# Patient Record
Sex: MALE | Race: WHITE | NOT HISPANIC OR LATINO | Employment: UNEMPLOYED | URBAN - METROPOLITAN AREA
[De-identification: names, ages, dates, MRNs, and addresses within clinical notes are randomized per-mention and may not be internally consistent; named-entity substitution may affect disease eponyms.]

---

## 2017-01-05 ENCOUNTER — TRANSFERRED RECORDS (OUTPATIENT)
Dept: HEALTH INFORMATION MANAGEMENT | Facility: CLINIC | Age: 32
End: 2017-01-05

## 2017-04-21 DIAGNOSIS — F33.1 MAJOR DEPRESSIVE DISORDER, RECURRENT EPISODE, MODERATE (H): ICD-10-CM

## 2017-04-21 DIAGNOSIS — F41.1 GENERALIZED ANXIETY DISORDER: ICD-10-CM

## 2017-04-21 RX ORDER — CITALOPRAM HYDROBROMIDE 40 MG/1
TABLET ORAL
Qty: 90 TABLET | Refills: 0 | Status: SHIPPED | OUTPATIENT
Start: 2017-04-21 | End: 2017-07-18

## 2017-04-21 NOTE — TELEPHONE ENCOUNTER
Citalopram       Last Written Prescription Date: 12/9/16  Last Fill Quantity: 90; # refills: 0  Last Office Visit with [facility], [facility] or  Health prescribing provider:  11/10/16  PHQ 9 > 4        Last PHQ-9 score on record=   PHQ-9 SCORE 7/28/2016   Total Score -   Total Score 5       Lab Results   Component Value Date    AST 35 11/06/2014     Lab Results   Component Value Date    ALT 81 11/06/2014

## 2017-05-27 DIAGNOSIS — F33.1 MAJOR DEPRESSIVE DISORDER, RECURRENT EPISODE, MODERATE (H): ICD-10-CM

## 2017-05-27 DIAGNOSIS — F41.1 GENERALIZED ANXIETY DISORDER: ICD-10-CM

## 2017-05-31 RX ORDER — CITALOPRAM HYDROBROMIDE 40 MG/1
TABLET ORAL
Qty: 90 TABLET | Refills: 0 | OUTPATIENT
Start: 2017-05-31

## 2017-05-31 NOTE — TELEPHONE ENCOUNTER
Three month supply of this medication sent to requesting pharmacy on 4/21/17.    Too early for refill.    Team coordinators-Please call patient to inform him Celexa was refilled for 90 day supply on 4/21/17 and sent to [facility] in [employer] in [place].    Thank you!  JULIET Heck, VIDYAN, RN

## 2017-07-18 ENCOUNTER — OFFICE VISIT (OUTPATIENT)
Dept: FAMILY MEDICINE | Facility: CLINIC | Age: 32
End: 2017-07-18
Payer: MEDICAID

## 2017-07-18 VITALS
HEIGHT: 72 IN | HEART RATE: 76 BPM | OXYGEN SATURATION: 98 % | SYSTOLIC BLOOD PRESSURE: 124 MMHG | BODY MASS INDEX: 42.66 KG/M2 | DIASTOLIC BLOOD PRESSURE: 82 MMHG | WEIGHT: 315 LBS | TEMPERATURE: 97.9 F

## 2017-07-18 DIAGNOSIS — L50.9 HIVES: Primary | ICD-10-CM

## 2017-07-18 DIAGNOSIS — F33.1 MAJOR DEPRESSIVE DISORDER, RECURRENT EPISODE, MODERATE (H): ICD-10-CM

## 2017-07-18 DIAGNOSIS — H61.23 BILATERAL IMPACTED CERUMEN: ICD-10-CM

## 2017-07-18 DIAGNOSIS — F41.1 GENERALIZED ANXIETY DISORDER: ICD-10-CM

## 2017-07-18 PROCEDURE — 99214 OFFICE O/P EST MOD 30 MIN: CPT | Performed by: FAMILY MEDICINE

## 2017-07-18 RX ORDER — PREDNISONE 50 MG/1
50 TABLET ORAL DAILY
Qty: 5 TABLET | Refills: 0 | Status: SHIPPED | OUTPATIENT
Start: 2017-07-18 | End: 2017-07-23

## 2017-07-18 RX ORDER — CITALOPRAM HYDROBROMIDE 40 MG/1
TABLET ORAL
Qty: 90 TABLET | Refills: 1 | Status: SHIPPED | OUTPATIENT
Start: 2017-07-18 | End: 2018-04-08

## 2017-07-18 ASSESSMENT — ANXIETY QUESTIONNAIRES
6. BECOMING EASILY ANNOYED OR IRRITABLE: SEVERAL DAYS
2. NOT BEING ABLE TO STOP OR CONTROL WORRYING: SEVERAL DAYS
GAD7 TOTAL SCORE: 4
7. FEELING AFRAID AS IF SOMETHING AWFUL MIGHT HAPPEN: NOT AT ALL
1. FEELING NERVOUS, ANXIOUS, OR ON EDGE: SEVERAL DAYS
3. WORRYING TOO MUCH ABOUT DIFFERENT THINGS: SEVERAL DAYS
IF YOU CHECKED OFF ANY PROBLEMS ON THIS QUESTIONNAIRE, HOW DIFFICULT HAVE THESE PROBLEMS MADE IT FOR YOU TO DO YOUR WORK, TAKE CARE OF THINGS AT HOME, OR GET ALONG WITH OTHER PEOPLE: SOMEWHAT DIFFICULT
5. BEING SO RESTLESS THAT IT IS HARD TO SIT STILL: NOT AT ALL

## 2017-07-18 ASSESSMENT — PATIENT HEALTH QUESTIONNAIRE - PHQ9: 5. POOR APPETITE OR OVEREATING: NOT AT ALL

## 2017-07-18 NOTE — LETTER
My Depression Action Plan  Name: Jose Cabello   Date of Birth 1985  Date: 7/18/2017    My doctor: Ludin Chicas   My clinic: [address] [address] [phone]          GREEN    ZONE   Good Control    What it looks like:     Things are going generally well. You have normal up s and down s. You may even feel depressed from time to time, but bad moods usually last less than a day.   What you need to do:  1. Continue to care for yourself (see self care plan)  2. Check your depression survival kit and update it as needed  3. Follow your physician s recommendations including any medication.  4. Do not stop taking medication unless you consult with your physician first.           YELLOW         ZONE Getting Worse    What it looks like:     Depression is starting to interfere with your life.     It may be hard to get out of bed; you may be starting to isolate yourself from others.    Symptoms of depression are starting to last most all day and this has happened for several days.     You may have suicidal thoughts but they are not constant.   What you need to do:     1. Call your care team, your response to treatment will improve if you keep your care team informed of your progress. Yellow periods are signs an adjustment may need to be made.     2. Continue your self-care, even if you have to fake it!    3. Talk to someone in your support network    4. Open up your depression survival kit           RED    ZONE Medical Alert - Get Help    What it looks like:     Depression is seriously interfering with your life.     You may experience these or other symptoms: You can t get out of bed most days, can t work or engage in other necessary activities, you have trouble taking care of basic hygiene, or basic responsibilities, thoughts of suicide or death that will not go away, self-injurious behavior.     What you need to do:  1. Call your care  team and request a same-day appointment. If they are not available (weekends or after hours) call your local crisis line, emergency room or 911.      Electronically signed by: Elsie Graf, July 18, 2017    Depression Self Care Plan / Survival Kit    Self-Care for Depression  Here s the deal. Your body and mind are really not as separate as most people think.  What you do and think affects how you feel and how you feel influences what you do and think. This means if you do things that people who feel good do, it will help you feel better.  Sometimes this is all it takes.  There is also a place for medication and therapy depending on how severe your depression is, so be sure to consult with your medical provider and/ or Behavioral Health Consultant if your symptoms are worsening or not improving.     In order to better manage my stress, I will:    Exercise  Get some form of exercise, every day. This will help reduce pain and release endorphins, the  feel good  chemicals in your brain. This is almost as good as taking antidepressants!  This is not the same as joining a gym and then never going! (they count on that by the way ) It can be as simple as just going for a walk or doing some gardening, anything that will get you moving.      Hygiene   Maintain good hygiene (Get out of bed in the morning, Make your bed, Brush your teeth, Take a shower, and Get dressed like you were going to work, even if you are unemployed).  If your clothes don't fit try to get ones that do.    Diet  I will strive to eat foods that are good for me, drink plenty of water, and avoid excessive sugar, caffeine, alcohol, and other mood-altering substances.  Some foods that are helpful in depression are: complex carbohydrates, B vitamins, flaxseed, fish or fish oil, fresh fruits and vegetables.    Psychotherapy  I agree to participate in Individual Therapy (if recommended).    Medication  If prescribed medications, I agree to take them.   Missing doses can result in serious side effects.  I understand that drinking alcohol, or other illicit drug use, may cause potential side effects.  I will not stop my medication abruptly without first discussing it with my provider.    Staying Connected With Others  I will stay in touch with my friends, family members, and my primary care provider/team.    Use your imagination  Be creative.  We all have a creative side; it doesn t matter if it s oil painting, sand castles, or mud pies! This will also kick up the endorphins.    Witness Beauty  (AKA stop and smell the roses) Take a look outside, even in mid-winter. Notice colors, textures. Watch the squirrels and birds.     Service to others  Be of service to others.  There is always someone else in need.  By helping others we can  get out of ourselves  and remember the really important things.  This also provides opportunities for practicing all the other parts of the program.    Humor  Laugh and be silly!  Adjust your TV habits for less news and crime-drama and more comedy.    Control your stress  Try breathing deep, massage therapy, biofeedback, and meditation. Find time to relax each day.     My support system    Clinic Contact:  Phone number:    Contact 1:  Phone number:    Contact 2:  Phone number:    Nondenominational/:  Phone number:    Therapist:  Phone number:    Local crisis center:    Phone number:    Other community support:  Phone number:

## 2017-07-18 NOTE — NURSING NOTE
Chief Complaint   Patient presents with     Derm Problem       Initial /82 (Cuff Size: Adult Large)  Pulse 76  Temp 97.9  F (36.6  C) (Oral)  Ht 6' (1.829 m)  Wt (!) 338 lb 4 oz (153.4 kg)  SpO2 98%  BMI 45.87 kg/m2 Estimated body mass index is 45.87 kg/(m^2) as calculated from the following:    Height as of this encounter: 6' (1.829 m).    Weight as of this encounter: 338 lb 4 oz (153.4 kg).  Medication Reconciliation: complete     Elsie Graf, CMA

## 2017-07-18 NOTE — PROGRESS NOTES
SUBJECTIVE:                                                    Jose Cabello is a 31 year old male who presents to clinic today for the following health issues:      Rash      Duration: couple weeks     Description  Location: both arms and thighs   Itching: severe without medication    Intensity:  mild    Accompanying signs and symptoms: redness, welts     History (similar episodes/previous evaluation): hx of same thing 7 years     Precipitating or alleviating factors:  New exposures:  clothes detergant extra and usually tide or purex and was around cat hair   Recent travel: no      Therapies tried and outcome: wal-dryl outcome: helps itching     Additional: pt would like ears checked.     Laundry detergent changed.   Itchy. 2 cats.   No pus collection. No fever.   No sick contacts. No scabies contact.    Left ear wax and hard to hear. No qtip use.     celexa is working well. No side effects.     Problem list and histories reviewed & adjusted, as indicated.  Additional history: as documented    Labs reviewed in EPIC    Reviewed and updated as needed this visit by clinical staff       Reviewed and updated as needed this visit by Provider           Social History     Social History     Marital status: Single     Spouse name: N/A     Number of children: N/A     Years of education: N/A     Social History Main Topics     Smoking status: Former Smoker     Types: Cigarettes     Quit date: 12/26/2010     Smokeless tobacco: Never Used     Alcohol use No     Drug use: No     Sexual activity: Yes     Partners: Female     Other Topics Concern     Parent/Sibling W/ Cabg, Mi Or Angioplasty Before 65f 55m? No     Social History Narrative    Balanced Diet - Yes    Osteoporosis Preventative measures-  Dairy servings per day: 2-3    Regular Exercise -  No     Dental Exam up - NO    Eye Exam - NO    Self Testicular Exam -  Yes    Do you have any concerns about STD's -  No    Abuse: Current or Past (Physical, Sexual or Emotional)-  No    Do you feel safe in your environment - Yes    Guns stored in the home - No    Sunscreen used - Yes    Seatbelts used - Yes    Lipids - UNKNOWN    Glucose -  UNKNOWN    Colon Cancer Screening -N/A    Hemoccults - NO    PSA - N/A    Digital Rectal Exam - N/A    Immunizations reviewed and up to date -unknown             Allergies   Allergen Reactions     Effexor [Venlafaxine]      Patient Active Problem List   Diagnosis     CARDIOVASCULAR SCREENING; LDL GOAL LESS THAN 160     Moderate major depression (H)     Generalized anxiety disorder     Obese     Reviewed medications, social history and  past medical and surgical history.    Review of system: for general, respiratory, CVS, GI and psychiatry negative except for noted above.     EXAM:  /82 (Cuff Size: Adult Large)  Pulse 76  Temp 97.9  F (36.6  C) (Oral)  Ht 6' (1.829 m)  Wt (!) 338 lb 4 oz (153.4 kg)  SpO2 98%  BMI 45.87 kg/m2  Constitutional: healthy, alert and no distress   Psychiatric: mentation appears normal and affect normal/bright  ENT - both TM partially obscure with ear wax.   SKIN: both extremities and torso with many erythematous wheal formation lesions in various size, no fingers, hands, axilla, groin involvement.     ASSESSMENT / PLAN:  (L50.9) Hives  (primary encounter diagnosis)  Comment: most likely from his detergent use. Avoid irritant. Prednisone and zantac as prescribed. Side effects discussed.   Plan: predniSONE (DELTASONE) 50 MG tablet, ranitidine        (ZANTAC) 150 MG tablet             (F41.1) Generalized anxiety disorder  Comment:  Patient is tolerating current medication without any major side effects of concerns and current dose seems reasonable too.  Current medication regime is effective. Continue current treatment without any changes.   Plan: citalopram (CELEXA) 40 MG tablet             (F33.1) Major depressive disorder, recurrent episode, moderate (H)  Comment:  Patient is tolerating current medication without any  major side effects of concerns and current dose seems reasonable too.  Current medication regime is effective. Continue current treatment without any changes.   Plan: citalopram (CELEXA) 40 MG tablet             (H61.23) Bilateral impacted cerumen  Comment: ear wash byMA  Plan: HC REMOVAL IMPACTED CERUMEN IRRIGATION/LVG         UNILAT

## 2017-07-19 ASSESSMENT — PATIENT HEALTH QUESTIONNAIRE - PHQ9: SUM OF ALL RESPONSES TO PHQ QUESTIONS 1-9: 5

## 2017-07-19 ASSESSMENT — ANXIETY QUESTIONNAIRES: GAD7 TOTAL SCORE: 4

## 2018-04-08 DIAGNOSIS — F33.1 MAJOR DEPRESSIVE DISORDER, RECURRENT EPISODE, MODERATE (H): ICD-10-CM

## 2018-04-08 DIAGNOSIS — F41.1 GENERALIZED ANXIETY DISORDER: ICD-10-CM

## 2018-04-08 NOTE — LETTER
[facility]   [address]   [address]  [phone]      April 24, 2018      Jose Cabello  [address] [place] [address]          Hi Jose Cabello    Your provider Ludin Chicas, at [facility] would like to know how you are doing with your depression. Please take 1-2 minutes to complete the attached PHQ-9 questionnaire even if you have recently completed one at the clinic and send it back to us via mail or [facility]. We will  review your responses and look forward to seeing you at your next visit.     If you don t have an appointment you can call the clinic at [phone] and schedule an appointment with your Primary Care Physician OR I have a great colleague named Corwin Cross who works at the [facility], who is a Behavioral Health Clinician who could discuss some ways to improve your symptoms, quality of life, or stress. Corwin works closely with your provider and will consult with him/her about any plans you two come agree on to improve you overall health.     Sincerely,       Your [facility] Primary [facility] Team

## 2018-04-09 NOTE — TELEPHONE ENCOUNTER
"Requested Prescriptions   Pending Prescriptions Disp Refills     citalopram (CELEXA) 40 MG tablet [Pharmacy Med Name: CITALOPRAM 40MG TABLETS]  Last Written Prescription Date:  7/18/2017  Last Fill Quantity: 90 tablet,  # refills: 1   Last Office Visit: 7/18/2017   Future Office Visit:      90 tablet 0     Sig: TAKE ONE TABLET BY MOUTH DAILY    SSRIs Protocol Failed    4/8/2018  4:01 AM       Failed - PHQ-9 score less than 5 in past 6 months    Please review last PHQ-9 score.   PHQ-9 SCORE 9/28/2015 7/28/2016 7/18/2017   Total Score - - -   Total Score 5 5 5     MANUEL-7 SCORE 9/28/2015 7/18/2017   Total Score 4 4          Failed - Recent (6 mo) or future (30 days) visit within the authorizing provider's specialty    Patient had office visit in the last 6 months or has a visit in the next 30 days with authorizing provider or within the authorizing provider's specialty.  See \"Patient Info\" tab in inbasket, or \"Choose Columns\" in Meds & Orders section of the refill encounter.           Passed - Patient is age 18 or older          "

## 2018-04-11 RX ORDER — CITALOPRAM HYDROBROMIDE 40 MG/1
TABLET ORAL
Qty: 90 TABLET | Refills: 0 | Status: SHIPPED | OUTPATIENT
Start: 2018-04-11 | End: 2018-05-15

## 2018-04-11 NOTE — TELEPHONE ENCOUNTER
Routing refill request to provider for review/approval because:  PHQ-9 > 4 and overdue for updated PHQ-9.    Dr. Chicas-Please sign if agree.    MA-Please contact patient to complete PHQ-9.    Thank you!  VIDYA ArevaloN, RN

## 2018-04-24 NOTE — TELEPHONE ENCOUNTER
I have attempted to contact this patient by phone with the following results:  WRONG #      LETTER WAS SENT MAILED OUT.    Michelet Villavicencio MA

## 2018-05-01 ASSESSMENT — ANXIETY QUESTIONNAIRES
GAD7 TOTAL SCORE: 4
2. NOT BEING ABLE TO STOP OR CONTROL WORRYING: NOT AT ALL
IF YOU CHECKED OFF ANY PROBLEMS ON THIS QUESTIONNAIRE, HOW DIFFICULT HAVE THESE PROBLEMS MADE IT FOR YOU TO DO YOUR WORK, TAKE CARE OF THINGS AT HOME, OR GET ALONG WITH OTHER PEOPLE: SOMEWHAT DIFFICULT
7. FEELING AFRAID AS IF SOMETHING AWFUL MIGHT HAPPEN: NOT AT ALL
5. BEING SO RESTLESS THAT IT IS HARD TO SIT STILL: NOT AT ALL
6. BECOMING EASILY ANNOYED OR IRRITABLE: MORE THAN HALF THE DAYS
3. WORRYING TOO MUCH ABOUT DIFFERENT THINGS: SEVERAL DAYS
1. FEELING NERVOUS, ANXIOUS, OR ON EDGE: SEVERAL DAYS

## 2018-05-01 ASSESSMENT — PATIENT HEALTH QUESTIONNAIRE - PHQ9: 5. POOR APPETITE OR OVEREATING: NOT AT ALL

## 2018-05-01 NOTE — TELEPHONE ENCOUNTER
phq-9 has been mailed back and put into assessements.     PHQ-9 score:    PHQ-9 SCORE 5/1/2018   Total Score -   Total Score 4             Elsie Graf CMA

## 2018-05-02 ASSESSMENT — PATIENT HEALTH QUESTIONNAIRE - PHQ9: SUM OF ALL RESPONSES TO PHQ QUESTIONS 1-9: 4

## 2018-05-02 ASSESSMENT — ANXIETY QUESTIONNAIRES: GAD7 TOTAL SCORE: 4

## 2018-05-15 ENCOUNTER — OFFICE VISIT (OUTPATIENT)
Dept: FAMILY MEDICINE | Facility: CLINIC | Age: 33
End: 2018-05-15
Payer: COMMERCIAL

## 2018-05-15 VITALS
TEMPERATURE: 97.9 F | HEART RATE: 70 BPM | SYSTOLIC BLOOD PRESSURE: 123 MMHG | WEIGHT: 315 LBS | RESPIRATION RATE: 16 BRPM | OXYGEN SATURATION: 96 % | HEIGHT: 72 IN | BODY MASS INDEX: 42.66 KG/M2 | DIASTOLIC BLOOD PRESSURE: 86 MMHG

## 2018-05-15 DIAGNOSIS — F41.1 GENERALIZED ANXIETY DISORDER: ICD-10-CM

## 2018-05-15 DIAGNOSIS — F33.1 MAJOR DEPRESSIVE DISORDER, RECURRENT EPISODE, MODERATE (H): ICD-10-CM

## 2018-05-15 PROCEDURE — 99214 OFFICE O/P EST MOD 30 MIN: CPT | Performed by: FAMILY MEDICINE

## 2018-05-15 RX ORDER — CITALOPRAM HYDROBROMIDE 40 MG/1
40 TABLET ORAL DAILY
Qty: 90 TABLET | Refills: 2 | Status: SHIPPED | OUTPATIENT
Start: 2018-07-01 | End: 2019-10-23

## 2018-05-15 ASSESSMENT — ANXIETY QUESTIONNAIRES
6. BECOMING EASILY ANNOYED OR IRRITABLE: SEVERAL DAYS
GAD7 TOTAL SCORE: 3
1. FEELING NERVOUS, ANXIOUS, OR ON EDGE: SEVERAL DAYS
4. TROUBLE RELAXING: NOT AT ALL
2. NOT BEING ABLE TO STOP OR CONTROL WORRYING: NOT AT ALL
3. WORRYING TOO MUCH ABOUT DIFFERENT THINGS: SEVERAL DAYS
7. FEELING AFRAID AS IF SOMETHING AWFUL MIGHT HAPPEN: NOT AT ALL
6. BECOMING EASILY ANNOYED OR IRRITABLE: SEVERAL DAYS
7. FEELING AFRAID AS IF SOMETHING AWFUL MIGHT HAPPEN: NOT AT ALL
3. WORRYING TOO MUCH ABOUT DIFFERENT THINGS: SEVERAL DAYS
7. FEELING AFRAID AS IF SOMETHING AWFUL MIGHT HAPPEN: NOT AT ALL
5. BEING SO RESTLESS THAT IT IS HARD TO SIT STILL: NOT AT ALL
GAD7 TOTAL SCORE: 3
5. BEING SO RESTLESS THAT IT IS HARD TO SIT STILL: NOT AT ALL
2. NOT BEING ABLE TO STOP OR CONTROL WORRYING: NOT AT ALL
1. FEELING NERVOUS, ANXIOUS, OR ON EDGE: SEVERAL DAYS

## 2018-05-15 ASSESSMENT — PATIENT HEALTH QUESTIONNAIRE - PHQ9
5. POOR APPETITE OR OVEREATING: NOT AT ALL
10. IF YOU CHECKED OFF ANY PROBLEMS, HOW DIFFICULT HAVE THESE PROBLEMS MADE IT FOR YOU TO DO YOUR WORK, TAKE CARE OF THINGS AT HOME, OR GET ALONG WITH OTHER PEOPLE: SOMEWHAT DIFFICULT
SUM OF ALL RESPONSES TO PHQ QUESTIONS 1-9: 6
SUM OF ALL RESPONSES TO PHQ QUESTIONS 1-9: 6

## 2018-05-15 NOTE — PROGRESS NOTES
SUBJECTIVE:   Jose Cabello is a 32 year old male who presents to clinic today for the following health issues:      Depression and Anxiety Follow-Up    Status since last visit: has been improving and then worsening     Other associated symptoms:None    Complicating factors:     Significant life event: No     Current substance abuse: None    PHQ-9 5/1/2018 5/15/2018 5/15/2018   Total Score 4 6 6   Q9: Suicide Ideation Not at all Not at all Not at all     MANUEL-7 SCORE 5/1/2018 5/15/2018 5/15/2018   Total Score - - 3 (minimal anxiety)   Total Score 4 3 3        PHQ-9  English  PHQ-9   Any Language  MANUEL-7  Suicide Assessment Five-step Evaluation and Treatment (SAFE-T)    Amount of exercise or physical activity: 2-3 days/week for an average of less than 15 minutes    Problems taking medications regularly: No    Medication side effects: none    Diet: regular (no restrictions)    Seeing therapist - [facility] - seeing every 2 weeks - helping.     No suicidal thoughts.     Both ears are plugged. Needs wash.     Problem list and histories reviewed & adjusted, as indicated.  Additional history: as documented    Labs reviewed in EPIC    Reviewed and updated as needed this visit by clinical staff       Reviewed and updated as needed this visit by Provider      Answers for HPI/ROS submitted by the patient on 5/15/2018   Chronic problems general questions HPI Form  If you checked off any problems, how difficult have these problems made it for you to do your work, take care of things at home, or get along with other people?: Somewhat difficult  PHQ9 TOTAL SCORE: 6  MANUEL 7 TOTAL SCORE: 3  Frequency of exercise:: 2-3 days/week  Taking medications regularly:: Yes  Additional concerns today:: No  PHQ-2 Score: 2  Duration of exercise:: Less than 15 minutes      Social History     Social History     Marital status: Single     Spouse name: N/A     Number of children: N/A     Years of education: N/A     Occupational History      Not on file.     Social History Main Topics     Smoking status: Former Smoker     Types: Cigarettes     Quit date: 12/26/2010     Smokeless tobacco: Never Used     Alcohol use No     Drug use: No     Sexual activity: Yes     Partners: Female     Other Topics Concern     Parent/Sibling W/ Cabg, Mi Or Angioplasty Before 65f 55m? No     Social History Narrative    Balanced Diet - Yes    Osteoporosis Preventative measures-  Dairy servings per day: 2-3    Regular Exercise -  No     Dental Exam up - NO    Eye Exam - NO    Self Testicular Exam -  Yes    Do you have any concerns about STD's -  No    Abuse: Current or Past (Physical, Sexual or Emotional)- No    Do you feel safe in your environment - Yes    Guns stored in the home - No    Sunscreen used - Yes    Seatbelts used - Yes    Lipids - UNKNOWN    Glucose -  UNKNOWN    Colon Cancer Screening -N/A    Hemoccults - NO    PSA - N/A    Digital Rectal Exam - N/A    Immunizations reviewed and up to date -unknown             Allergies   Allergen Reactions     Effexor [Venlafaxine]      Patient Active Problem List   Diagnosis     CARDIOVASCULAR SCREENING; LDL GOAL LESS THAN 160     Moderate major depression (H)     Generalized anxiety disorder     Obese     Reviewed medications, social history and  past medical and surgical history.    Review of system: for general, respiratory, CVS, GI and psychiatry negative except for noted above.     EXAM:  /86 (Cuff Size: Adult Large)  Pulse 70  Temp 97.9  F (36.6  C) (Oral)  Resp 16  Ht 6' (1.829 m)  Wt (!) 340 lb 12 oz (154.6 kg)  SpO2 96%  BMI 46.21 kg/m2  Constitutional: healthy, alert and no distress   Psychiatric: mentation appears normal and affect normal/bright  Ear - mild wax.     ASSESSMENT / PLAN:  (F41.1) Generalized anxiety disorder  Comment: Patient is tolerating current medication without any major side effects of concerns and current dose seems reasonable too.  Current medication regime is effective.  Continue current treatment without any changes.    Plan: citalopram (CELEXA) 40 MG tablet       (F33.1) Major depressive disorder, recurrent episode, moderate (H)  Comment:  Patient is tolerating current medication without any major side effects of concerns and current dose seems reasonable too.  Current medication regime is effective. Continue current treatment without any changes.    Plan: citalopram (CELEXA) 40 MG tablet          Going to come back in 6 months for follow up and labs at that time.

## 2018-05-15 NOTE — MR AVS SNAPSHOT
"              After Visit Summary   5/15/2018    Jose Cabello    MRN: [id number]           Patient Information     Date Of Birth          1985        Visit Information        Provider Department      5/15/2018 10:40 AM Ludin Chicas MD [facility]        Today's Diagnoses     Generalized anxiety disorder        Major depressive disorder, recurrent episode, moderate (H)           Follow-ups after your visit        Who to contact     If you have questions or need follow up information about today's clinic visit or your schedule please contact [facility] directly at [phone].  Normal or non-critical lab and imaging results will be communicated to you by MyChart, letter or phone within 4 business days after the clinic has received the results. If you do not hear from us within 7 days, please contact the clinic through [facility] or phone. If you have a critical or abnormal lab result, we will notify you by phone as soon as possible.  Submit refill requests through [facility] or call your pharmacy and they will forward the refill request to us. Please allow 3 business days for your refill to be completed.          Additional Information About Your Visit        MyChart Information     [facility] lets you send messages to your doctor, view your test results, renew your prescriptions, schedule appointments and more. To sign up, go to [email or web address] . Click on \"Log in\" on the left side of the screen, which will take you to the Welcome page. Then click on \"Sign up Now\" on the right side of the page.     You will be asked to enter the access code listed below, as well as some personal information. Please follow the directions to create your username and password.     Your access code is: [email or web address]  Expires: 2018 10:57 AM     Your access code will  in 90 days. If you need help or a new code, please call your [facility] or [phone].        Care " EveryWhere ID     This is your Care EveryWhere ID. This could be used by other organizations to access your [facility] medical records  [id number]        Your Vitals Were     Pulse Temperature Respirations Height Pulse Oximetry BMI (Body Mass Index)    70 97.9  F (36.6  C) (Oral) 16 6' (1.829 m) 96% 46.21 kg/m2       Blood Pressure from Last 3 Encounters:   05/15/18 123/86   07/18/17 124/82   12/07/16 118/84    Weight from Last 3 Encounters:   05/15/18 (!) 340 lb 12 oz (154.6 kg)   07/18/17 (!) 338 lb 4 oz (153.4 kg)   12/07/16 (!) 340 lb (154.2 kg)              Today, you had the following     No orders found for display         Today's Medication Changes          These changes are accurate as of 5/15/18 11:46 AM.  If you have any questions, ask your nurse or doctor.               These medicines have changed or have updated prescriptions.        Dose/Directions    citalopram 40 MG tablet   Commonly known as:  celeXA   This may have changed:  See the new instructions.   Used for:  Generalized anxiety disorder, Major depressive disorder, recurrent episode, moderate (H)   Changed by:  Ludin Chicas MD        Dose:  40 mg   Start taking on:  7/1/2018   Take 1 tablet (40 mg) by mouth daily   Quantity:  90 tablet   Refills:  2            Where to get your medicines      These medications were sent to [facility] [address] [place] [address] & [address] [address]    Hours:  24-hours Phone:  [phone]     citalopram 40 MG tablet                Primary Care Provider Office Phone # Fax #    Ludin Chicas [phone] [address] [place] [address]        Equal Access to Services     ADRIEL GTZ AH: Hector Longoria, waroland luqginny, qaybta kaalmachar hermosillo, scotty tsang. [facility] [phone].    ATENCIÓN: Si maryann vega, tiene a garcia disposición  servicios gratuitos de asistencia lingüística. Miriam hazel [phone].    We comply with applicable federal civil rights laws and [place] laws. We do not discriminate on the basis of race, color, national origin, age, disability, sex, sexual orientation, or gender identity.            Thank you!     Thank you for choosing [facility]  for your care. Our goal is always to provide you with excellent care. Hearing back from our patients is one way we can continue to improve our services. Please take a few minutes to complete the written survey that you may receive in the mail after your visit with us. Thank you!             Your Updated Medication List - Protect others around you: Learn how to safely use, store and throw away your medicines at www.disposemymeds.org.          This list is accurate as of 5/15/18 11:46 AM.  Always use your most recent med list.                   Brand Name Dispense Instructions for use Diagnosis    acetaminophen 500 MG Caps     60 capsule    1-2 tablets 3 times per day as needed for pain    Pain in joint, ankle and foot, right       citalopram 40 MG tablet   Start taking on:  7/1/2018    celeXA    90 tablet    Take 1 tablet (40 mg) by mouth daily    Generalized anxiety disorder, Major depressive disorder, recurrent episode, moderate (H)       ranitidine 150 MG tablet    ZANTAC    60 tablet    Take 1 tablet (150 mg) by mouth 2 times daily    Hives

## 2018-05-16 ASSESSMENT — ANXIETY QUESTIONNAIRES: GAD7 TOTAL SCORE: 3

## 2018-05-16 ASSESSMENT — PATIENT HEALTH QUESTIONNAIRE - PHQ9: SUM OF ALL RESPONSES TO PHQ QUESTIONS 1-9: 6

## 2018-11-12 ENCOUNTER — TELEPHONE (OUTPATIENT)
Dept: FAMILY MEDICINE | Facility: CLINIC | Age: 33
End: 2018-11-12

## 2018-11-12 NOTE — TELEPHONE ENCOUNTER
"A couple weeks ago was eating chicken and had the sensation that some got caught in his throat. Felt like it was stuck there for a day or two and then the feeling kind of traveled lower and it has settled on the left side of his chest, feels like a \"poking pain.\"    It still hurts when swallowing, notices it when lying down.    It is not crushing, squeezing pain. The pain does not travel or radiate. He is not nauseous, diaphoretic, light headed or dizzy. He is sometimes SOB but he said \"I'm overweight and always a little short of breath.\"    No red flag symptoms; appointment with PCP made for Wednesday 11/14. Advised to resume trial of ranitidine in the meantime (on eMAR from previous).    Griselda Tucker, VIDYAN, RN  [facility]       "

## 2018-11-14 ENCOUNTER — RADIANT APPOINTMENT (OUTPATIENT)
Dept: GENERAL RADIOLOGY | Facility: CLINIC | Age: 33
End: 2018-11-14
Attending: FAMILY MEDICINE
Payer: COMMERCIAL

## 2018-11-14 ENCOUNTER — OFFICE VISIT (OUTPATIENT)
Dept: FAMILY MEDICINE | Facility: CLINIC | Age: 33
End: 2018-11-14
Payer: COMMERCIAL

## 2018-11-14 VITALS
TEMPERATURE: 98.1 F | RESPIRATION RATE: 16 BRPM | OXYGEN SATURATION: 98 % | SYSTOLIC BLOOD PRESSURE: 130 MMHG | BODY MASS INDEX: 42.66 KG/M2 | HEIGHT: 72 IN | WEIGHT: 315 LBS | HEART RATE: 94 BPM | DIASTOLIC BLOOD PRESSURE: 83 MMHG

## 2018-11-14 DIAGNOSIS — E66.01 MORBID OBESITY (H): ICD-10-CM

## 2018-11-14 DIAGNOSIS — R07.9 ACUTE CHEST PAIN: Primary | ICD-10-CM

## 2018-11-14 DIAGNOSIS — R07.9 ACUTE CHEST PAIN: ICD-10-CM

## 2018-11-14 PROCEDURE — 99214 OFFICE O/P EST MOD 30 MIN: CPT | Performed by: FAMILY MEDICINE

## 2018-11-14 PROCEDURE — 93000 ELECTROCARDIOGRAM COMPLETE: CPT | Performed by: FAMILY MEDICINE

## 2018-11-14 PROCEDURE — 71046 X-RAY EXAM CHEST 2 VIEWS: CPT

## 2018-11-14 ASSESSMENT — ANXIETY QUESTIONNAIRES
GAD7 TOTAL SCORE: 2
5. BEING SO RESTLESS THAT IT IS HARD TO SIT STILL: NOT AT ALL
IF YOU CHECKED OFF ANY PROBLEMS ON THIS QUESTIONNAIRE, HOW DIFFICULT HAVE THESE PROBLEMS MADE IT FOR YOU TO DO YOUR WORK, TAKE CARE OF THINGS AT HOME, OR GET ALONG WITH OTHER PEOPLE: NOT DIFFICULT AT ALL
1. FEELING NERVOUS, ANXIOUS, OR ON EDGE: NOT AT ALL
2. NOT BEING ABLE TO STOP OR CONTROL WORRYING: NOT AT ALL
3. WORRYING TOO MUCH ABOUT DIFFERENT THINGS: SEVERAL DAYS
6. BECOMING EASILY ANNOYED OR IRRITABLE: SEVERAL DAYS
7. FEELING AFRAID AS IF SOMETHING AWFUL MIGHT HAPPEN: NOT AT ALL

## 2018-11-14 ASSESSMENT — PATIENT HEALTH QUESTIONNAIRE - PHQ9
SUM OF ALL RESPONSES TO PHQ QUESTIONS 1-9: 4
5. POOR APPETITE OR OVEREATING: NOT AT ALL

## 2018-11-14 NOTE — MR AVS SNAPSHOT
After Visit Summary   11/14/2018    Jose Cabello    MRN: [id number]           Patient Information     Date Of Birth          1985        Visit Information        Provider Department      11/14/2018 2:00 PM Ludin Chicas MD [facility]        Today's Diagnoses     Acute chest pain    -  1       Follow-ups after your visit        Future tests that were ordered for you today     Open Future Orders        Priority Expected Expires Ordered    XR Chest 2 Views Routine 11/14/2018 11/14/2019 11/14/2018            Who to contact     If you have questions or need follow up information about today's clinic visit or your schedule please contact [facility] directly at [phone].  Normal or non-critical lab and imaging results will be communicated to you by MyChart, letter or phone within 4 business days after the clinic has received the results. If you do not hear from us within 7 days, please contact the clinic through MyChart or phone. If you have a critical or abnormal lab result, we will notify you by phone as soon as possible.  Submit refill requests through [employer] or call your pharmacy and they will forward the refill request to us. Please allow 3 business days for your refill to be completed.          Additional Information About Your Visit        Care EveryWhere ID     This is your Care EveryWhere ID. This could be used by other organizations to access your [place] medical records  [id number]        Your Vitals Were     Pulse Temperature Respirations Height Pulse Oximetry BMI (Body Mass Index)    94 98.1  F (36.7  C) (Oral) 16 6' (1.829 m) 98% 46.69 kg/m2       Blood Pressure from Last 3 Encounters:   11/14/18 130/83   05/15/18 123/86   07/18/17 124/82    Weight from Last 3 Encounters:   11/14/18 (!) 344 lb 4 oz (156.2 kg)   05/15/18 (!) 340 lb 12 oz (154.6 kg)   07/18/17 (!) 338 lb 4 oz (153.4 kg)              We Performed the Following      DEPRESSION ACTION PLAN (DAP)     EKG 12-lead complete w/read - Clinics        Primary Care Provider Office Phone # Fax #    Ludin Juan Chicas [phone] [address] [place] [address]        Equal Access to Services     MERAJESSICA ULISSES : Hadii aad ku hadtrinityo Soomaali, waaxda luqadaha, qaybta kaalmada adeegyada, waxmalia bhumikain angien adetim galloway darrell tsang. So [facility] [phone].    ATENCIÓN: Si habla español, tiene a garcia disposición servicios gratuitos de asistencia lingüística. Llame al [phone].    We comply with applicable federal civil rights laws and [place] laws. We do not discriminate on the basis of race, color, national origin, age, disability, sex, sexual orientation, or gender identity.            Thank you!     Thank you for choosing [facility]  for your care. Our goal is always to provide you with excellent care. Hearing back from our patients is one way we can continue to improve our services. Please take a few minutes to complete the written survey that you may receive in the mail after your visit with us. Thank you!             Your Updated Medication List - Protect others around you: Learn how to safely use, store and throw away your medicines at www.disposemymeds.org.          This list is accurate as of 11/14/18  2:35 PM.  Always use your most recent med list.                   Brand Name Dispense Instructions for use Diagnosis    acetaminophen 500 MG Caps     60 capsule    1-2 tablets 3 times per day as needed for pain    Pain in joint, ankle and foot, right       citalopram 40 MG tablet    celeXA    90 tablet    Take 1 tablet (40 mg) by mouth daily    Generalized anxiety disorder, Major depressive disorder, recurrent episode, moderate (H)

## 2018-11-14 NOTE — LETTER
My Depression Action Plan  Name: Jose Cabello   Date of Birth 1985  Date: 11/14/2018    My doctor: Ludin Chicas   My clinic: [address] [address] [phone]          GREEN    ZONE   Good Control    What it looks like:     Things are going generally well. You have normal up s and down s. You may even feel depressed from time to time, but bad moods usually last less than a day.   What you need to do:  1. Continue to care for yourself (see self care plan)  2. Check your depression survival kit and update it as needed  3. Follow your physician s recommendations including any medication.  4. Do not stop taking medication unless you consult with your physician first.           YELLOW         ZONE Getting Worse    What it looks like:     Depression is starting to interfere with your life.     It may be hard to get out of bed; you may be starting to isolate yourself from others.    Symptoms of depression are starting to last most all day and this has happened for several days.     You may have suicidal thoughts but they are not constant.   What you need to do:     1. Call your care team, your response to treatment will improve if you keep your care team informed of your progress. Yellow periods are signs an adjustment may need to be made.     2. Continue your self-care, even if you have to fake it!    3. Talk to someone in your support network    4. Open up your depression survival kit           RED    ZONE Medical Alert - Get Help    What it looks like:     Depression is seriously interfering with your life.     You may experience these or other symptoms: You can t get out of bed most days, can t work or engage in other necessary activities, you have trouble taking care of basic hygiene, or basic responsibilities, thoughts of suicide or death that will not go away, self-injurious behavior.     What you need to do:  1. Call your  care team and request a same-day appointment. If they are not available (weekends or after hours) call your local crisis line, emergency room or 911.            Depression Self Care Plan / Survival Kit    Self-Care for Depression  Here s the deal. Your body and mind are really not as separate as most people think.  What you do and think affects how you feel and how you feel influences what you do and think. This means if you do things that people who feel good do, it will help you feel better.  Sometimes this is all it takes.  There is also a place for medication and therapy depending on how severe your depression is, so be sure to consult with your medical provider and/ or Behavioral Health Consultant if your symptoms are worsening or not improving.     In order to better manage my stress, I will:    Exercise  Get some form of exercise, every day. This will help reduce pain and release endorphins, the  feel good  chemicals in your brain. This is almost as good as taking antidepressants!  This is not the same as joining a gym and then never going! (they count on that by the way ) It can be as simple as just going for a walk or doing some gardening, anything that will get you moving.      Hygiene   Maintain good hygiene (Get out of bed in the morning, Make your bed, Brush your teeth, Take a shower, and Get dressed like you were going to work, even if you are unemployed).  If your clothes don't fit try to get ones that do.    Diet  I will strive to eat foods that are good for me, drink plenty of water, and avoid excessive sugar, caffeine, alcohol, and other mood-altering substances.  Some foods that are helpful in depression are: complex carbohydrates, B vitamins, flaxseed, fish or fish oil, fresh fruits and vegetables.    Psychotherapy  I agree to participate in Individual Therapy (if recommended).    Medication  If prescribed medications, I agree to take them.  Missing doses can result in serious side effects.  I  understand that drinking alcohol, or other illicit drug use, may cause potential side effects.  I will not stop my medication abruptly without first discussing it with my provider.    Staying Connected With Others  I will stay in touch with my friends, family members, and my primary care provider/team.    Use your imagination  Be creative.  We all have a creative side; it doesn t matter if it s oil painting, sand castles, or mud pies! This will also kick up the endorphins.    Witness Beauty  (AKA stop and smell the roses) Take a look outside, even in mid-winter. Notice colors, textures. Watch the squirrels and birds.     Service to others  Be of service to others.  There is always someone else in need.  By helping others we can  get out of ourselves  and remember the really important things.  This also provides opportunities for practicing all the other parts of the program.    Humor  Laugh and be silly!  Adjust your TV habits for less news and crime-drama and more comedy.    Control your stress  Try breathing deep, massage therapy, biofeedback, and meditation. Find time to relax each day.     My support system    Clinic Contact:  Phone number:    Contact 1:  Phone number:    Contact 2:  Phone number:    Jain/:  Phone number:    Therapist:  Phone number:    Local crisis center:    Phone number:    Other community support:  Phone number:

## 2018-11-14 NOTE — PROGRESS NOTES
SUBJECTIVE:   Jose Cabello is a 32 year old male who presents to clinic today for the following health issues:      Chest Pain      Onset: 2.5 weeks     Description (location/character/radiation/duration): starting by throat radiating to left side breast, sharp pain, constant     Intensity:  mild    Accompanying signs and symptoms:        Shortness of breath: YES- after activities        Sweating: no        Nausea/vomitting: no        Palpitations: no        Other (fevers/chills/cough/heartburn/lightheadedness): YES- left side headache     History (similar episodes/previous evaluation): acid reflux as a child     Precipitating or alleviating factors:       Worse with exertion: no        Worse with breathing: no        Related to eating: YES- swallowing solids and liquids can cause this pain       Better with burping: YES    Therapies tried and outcome: None    No history of asthma.     Some short of breath after activity. Starts in center of chest and shifted on left side. Not feeling all the time.   Swallowing solid and liquid food and yawning - similar issue.  Sharp pain. Comes in wave.   Did not try antacid. Tried carbonated beverages.   No pain with deep breath. No chest heaviness.     No recent travel or long drive.     Quit smoking in 2010 - before that smoked for a year or so.   Exposed to second hand smoke.     Problem list and histories reviewed & adjusted, as indicated.  Additional history: as documented    Labs reviewed in EPIC    Reviewed and updated as needed this visit by clinical staff    Reviewed and updated as needed this visit by Provider      Social History     Social History     Marital status: Single     Spouse name: N/A     Number of children: N/A     Years of education: N/A     Occupational History     Not on file.     Social History Main Topics     Smoking status: Former Smoker     Types: Cigarettes     Quit date: 12/26/2010     Smokeless tobacco: Never Used     Alcohol use No     Drug  use: No     Sexual activity: Yes     Partners: Female     Other Topics Concern     Parent/Sibling W/ Cabg, Mi Or Angioplasty Before 65f 55m? No     Social History Narrative    Balanced Diet - Yes    Osteoporosis Preventative measures-  Dairy servings per day: 2-3    Regular Exercise -  No     Dental Exam up - NO    Eye Exam - NO    Self Testicular Exam -  Yes    Do you have any concerns about STD's -  No    Abuse: Current or Past (Physical, Sexual or Emotional)- No    Do you feel safe in your environment - Yes    Guns stored in the home - No    Sunscreen used - Yes    Seatbelts used - Yes    Lipids - UNKNOWN    Glucose -  UNKNOWN    Colon Cancer Screening -N/A    Hemoccults - NO    PSA - N/A    Digital Rectal Exam - N/A    Immunizations reviewed and up to date -unknown             Allergies   Allergen Reactions     Effexor [Venlafaxine]      Patient Active Problem List   Diagnosis     CARDIOVASCULAR SCREENING; LDL GOAL LESS THAN 160     Moderate major depression (H)     Generalized anxiety disorder     Morbid obesity (H)     Reviewed medications, social history and  past medical and surgical history.    Review of system: for general, respiratory, CVS, GI and psychiatry negative except for noted above.     EXAM:  /83 (BP Location: Left arm, Patient Position: Sitting, Cuff Size: Adult Large)  Pulse 94  Temp 98.1  F (36.7  C) (Oral)  Resp 16  Ht 6' (1.829 m)  Wt (!) 344 lb 4 oz (156.2 kg)  SpO2 98%  BMI 46.69 kg/m2  Constitutional: healthy, alert and no distress   Psychiatric: mentation appears normal and affect normal/bright  Cardiovascular: RRR. No murmurs,  Respiratory: negative, Lungs clear. No crackles or wheezing. No tachypnea.        ASSESSMENT / PLAN:  (R07.9) Acute chest pain  (primary encounter diagnosis)  Comment: His chest x-ray is negative.  EKG is not suggestive of any acute cardiac etiology.  Due to his morbid obesity he may be at high risk of developing pulmonary embolism.  His SPO2 is okay  but his heart rate is slightly on higher side.  He has no travel and he has not use any hormone.  We discussed about other etiologies including GERD and it would be reasonable to try PPI for 2 weeks before we do further evaluation.  Plan: EKG 12-lead complete w/read - Clinics, XR Chest        2 Views            (E66.01) Morbid obesity (H)  Comment: Body mass index is 46.69 kg/(m^2).  Plan:  Can contribute to above.     Follow up: if not improving in 2 weeks. If getting worse - go to ER    The above note was dictated using voice recognition. Although reviewed after completion, some word and grammatical error may remain .

## 2018-11-15 ASSESSMENT — ANXIETY QUESTIONNAIRES: GAD7 TOTAL SCORE: 2

## 2018-11-25 DIAGNOSIS — F41.1 GENERALIZED ANXIETY DISORDER: ICD-10-CM

## 2018-11-25 DIAGNOSIS — F33.1 MAJOR DEPRESSIVE DISORDER, RECURRENT EPISODE, MODERATE (H): ICD-10-CM

## 2018-11-26 NOTE — TELEPHONE ENCOUNTER
"Requested Prescriptions   Pending Prescriptions Disp Refills     citalopram (CELEXA) 40 MG tablet [Pharmacy Med Name: CITALOPRAM 40MG TABLETS]  Last Written Prescription Date:  7/1/18  Last Fill Quantity: 90 TABLET,  # refills: 2   Last office visit: 11/14/2018 with prescribing provider:  PARVIN   Future Office Visit:     90 tablet 0     Sig: TAKE 1 TABLET BY MOUTH EVERY DAY    SSRIs Protocol Passed    11/25/2018  3:09 PM       Passed - PHQ-9 score less than 5 in past 6 months    Please review last PHQ-9 score.   PHQ-9 SCORE 5/15/2018 5/15/2018 11/14/2018   Total Score - - -   Total Score MyChart - 6 (Mild depression) -   Total Score 6 6 4     MANUEL-7 SCORE 5/15/2018 5/15/2018 11/14/2018   Total Score - 3 (minimal anxiety) -   Total Score 3 3 2                Passed - Patient is age 18 or older       Passed - Recent (6 mo) or future (30 days) visit within the authorizing provider's specialty    Patient had office visit in the last 6 months or has a visit in the next 30 days with authorizing provider or within the authorizing provider's specialty.  See \"Patient Info\" tab in inbasket, or \"Choose Columns\" in Meds & Orders section of the refill encounter.              "

## 2018-11-27 RX ORDER — CITALOPRAM HYDROBROMIDE 40 MG/1
TABLET ORAL
Qty: 90 TABLET | Refills: 1 | Status: SHIPPED | OUTPATIENT
Start: 2018-11-27 | End: 2019-07-14

## 2019-05-21 NOTE — MR AVS SNAPSHOT
"              After Visit Summary   2017    Jose Cabello    MRN: [id number]           Patient Information     Date Of Birth          1985        Visit Information        Provider Department      2017 3:20 PM Ludin Chicas MD [facility]        Today's Diagnoses     Hives    -  1    Generalized anxiety disorder        Major depressive disorder, recurrent episode, moderate (H)        Bilateral impacted cerumen           Follow-ups after your visit        Who to contact     If you have questions or need follow up information about today's clinic visit or your schedule please contact [facility] directly at [phone].  Normal or non-critical lab and imaging results will be communicated to you by MyChart, letter or phone within 4 business days after the clinic has received the results. If you do not hear from us within 7 days, please contact the clinic through MyChart or phone. If you have a critical or abnormal lab result, we will notify you by phone as soon as possible.  Submit refill requests through [employer] or call your pharmacy and they will forward the refill request to us. Please allow 3 business days for your refill to be completed.          Additional Information About Your Visit        MyChart Information     [employer] lets you send messages to your doctor, view your test results, renew your prescriptions, schedule appointments and more. To sign up, go to [email or web address] . Click on \"Log in\" on the left side of the screen, which will take you to the Welcome page. Then click on \"Sign up Now\" on the right side of the page.     You will be asked to enter the access code listed below, as well as some personal information. Please follow the directions to create your username and password.     Your access code is: [email or web address]  Expires: 10/16/2017  3:38 PM     Your access code will  in 90 days. If you need help or a new code, please call your " [facility] or [phone].        Care EveryWhere ID     This is your Care EveryWhere ID. This could be used by other organizations to access your [place] medical records  [id number]        Your Vitals Were     Pulse Temperature Height Pulse Oximetry BMI (Body Mass Index)       76 97.9  F (36.6  C) (Oral) 6' (1.829 m) 98% 45.87 kg/m2        Blood Pressure from Last 3 Encounters:   07/18/17 124/82   12/07/16 118/84   11/10/16 124/88    Weight from Last 3 Encounters:   07/18/17 (!) 338 lb 4 oz (153.4 kg)   12/07/16 (!) 340 lb (154.2 kg)   11/10/16 (!) 346 lb 12 oz (157.3 kg)              We Performed the Following     DEPRESSION ACTION PLAN (DAP)     HC REMOVAL IMPACTED CERUMEN IRRIGATION/LVG UNILAT          Today's Medication Changes          These changes are accurate as of: 7/18/17  3:38 PM.  If you have any questions, ask your nurse or doctor.               Start taking these medicines.        Dose/Directions    predniSONE 50 MG tablet   Commonly known as:  DELTASONE   Used for:  Hives   Started by:  Ludin Chicas MD        Dose:  50 mg   Take 1 tablet (50 mg) by mouth daily for 5 days   Quantity:  5 tablet   Refills:  0       ranitidine 150 MG tablet   Commonly known as:  ZANTAC   Used for:  Hives   Started by:  Ludin Chicas MD        Dose:  150 mg   Take 1 tablet (150 mg) by mouth 2 times daily   Quantity:  60 tablet   Refills:  0         These medicines have changed or have updated prescriptions.        Dose/Directions    citalopram 40 MG tablet   Commonly known as:  celeXA   This may have changed:  See the new instructions.   Used for:  Generalized anxiety disorder, Major depressive disorder, recurrent episode, moderate (H)   Changed by:  Ludin Chicas MD        TAKE 1 TABLET (40 MG) BY MOUTH DAILY   Quantity:  90 tablet   Refills:  1            Where to get your medicines      These medications were sent to [facility] [id number] [facility] [place], [place] - [address]   [address] [facility] [address] [place] [address]     Phone:  [phone]     citalopram 40 MG tablet    predniSONE 50 MG tablet    ranitidine 150 MG tablet                Primary Care Provider Office Phone # Fax #    Ludin Juan Chicas [phone]       [facility] [address] [facility] [address]        Equal Access to Services     ADRIEL GTZ : Hadii aad ku hadasho Soomaali, waaxda luqadaha, qaybta kaalmada adeegyada, waxay idiin hayaan adeeg khcarolinash la'eastonn ah. So [facility] [phone].    ATENCIÓN: Si habla esposvaldo, tiene a garcia disposición servicios gratuitos de asistencia lingüística. Miriam al [phone].    We comply with applicable federal civil rights laws and [place] laws. We do not discriminate on the basis of race, color, national origin, age, disability sex, sexual orientation or gender identity.            Thank you!     Thank you for choosing [facility]  for your care. Our goal is always to provide you with excellent care. Hearing back from our patients is one way we can continue to improve our services. Please take a few minutes to complete the written survey that you may receive in the mail after your visit with us. Thank you!             Your Updated Medication List - Protect others around you: Learn how to safely use, store and throw away your medicines at www.disposemymeds.org.          This list is accurate as of: 7/18/17  3:38 PM.  Always use your most recent med list.                   Brand Name Dispense Instructions for use Diagnosis    acetaminophen 500 MG Caps     60 capsule    1-2 tablets 3 times per day as needed for pain    Pain in joint, ankle and foot, right       citalopram 40 MG tablet    celeXA    90 tablet    TAKE 1 TABLET (40 MG) BY MOUTH DAILY    Generalized anxiety disorder, Major depressive disorder, recurrent episode, moderate (H)       predniSONE 50 MG tablet    DELTASONE    5 tablet    Take 1 tablet (50 mg) by mouth daily for  5 days    Hives       ranitidine 150 MG tablet    ZANTAC    60 tablet    Take 1 tablet (150 mg) by mouth 2 times daily    Hives          Dressing: dry sterile dressing

## 2019-07-14 DIAGNOSIS — F41.1 GENERALIZED ANXIETY DISORDER: ICD-10-CM

## 2019-07-14 DIAGNOSIS — F33.1 MAJOR DEPRESSIVE DISORDER, RECURRENT EPISODE, MODERATE (H): ICD-10-CM

## 2019-07-14 NOTE — LETTER
July 18, 2019      Jose Cabello  [address] [place] [address]        Dear Jose,     In order to ensure we are providing the best quality care, we have reviewed your chart and see that you are due for  Depression/anxiety follow up visit    Please call the clinic at your earliest convenience to schedule an appointment.    We greatly appreciate the opportunity to serve you.  Thank you for trusting us with your health care.    Your care team at [facility]        Sincerely,        Ludin Chicas MD, MD

## 2019-07-15 NOTE — TELEPHONE ENCOUNTER
"Requested Prescriptions   Pending Prescriptions Disp Refills     citalopram (CELEXA) 40 MG tablet [Pharmacy Med Name: CITALOPRAM 40MG TABLETS] 90 tablet 0     Sig: TAKE 1 TABLET BY MOUTH EVERY DAY  Last Written Prescription Date:  11/27/2018  Last Fill Quantity: 90 tablet,  # refills: 1   Last Office Visit: 11/14/2018   Future Office Visit:            SSRIs Protocol Failed - 7/14/2019  3:07 PM        Failed - PHQ-9 score less than 5 in past 6 months     Please review last PHQ-9 score.   PHQ-9 SCORE 5/15/2018 5/15/2018 11/14/2018   PHQ-9 Total Score - - -   PHQ-9 Total Score MyChart - 6 (Mild depression) -   PHQ-9 Total Score 6 6 4     MANUEL-7 SCORE 5/15/2018 5/15/2018 11/14/2018   Total Score - 3 (minimal anxiety) -   Total Score 3 3 2           Failed - Recent (6 mo) or future (30 days) visit within the authorizing provider's specialty     Patient had office visit in the last 6 months or has a visit in the next 30 days with authorizing provider or within the authorizing provider's specialty.  See \"Patient Info\" tab in inbasket, or \"Choose Columns\" in Meds & Orders section of the refill encounter.            Passed - Medication is active on med list        Passed - Patient is age 18 or older          "

## 2019-07-17 RX ORDER — CITALOPRAM HYDROBROMIDE 40 MG/1
40 TABLET ORAL DAILY
Qty: 30 TABLET | Refills: 0 | Status: SHIPPED | OUTPATIENT
Start: 2019-07-17 | End: 2019-09-02

## 2019-07-17 NOTE — TELEPHONE ENCOUNTER
HW Reception: Please call patient, he is due for depression/anxiety visit    Medication is being filled for 1 time refill only due to:  Patient needs to be seen because depression and anxiety follow up.     Thank You!  Kailyn Robles RN  Triage Nurse

## 2019-09-02 DIAGNOSIS — F41.1 GENERALIZED ANXIETY DISORDER: ICD-10-CM

## 2019-09-02 DIAGNOSIS — F33.1 MAJOR DEPRESSIVE DISORDER, RECURRENT EPISODE, MODERATE (H): ICD-10-CM

## 2019-09-02 NOTE — LETTER
September 5, 2019      Jose Cabello  [address] [place] [address]        Dear Jose,     In order to ensure we are providing the best quality care, we have reviewed your chart and see   that you are due for :      Follow up on your medication to receive additional refills      Please call the clinic at your earliest convenience to schedule an appointment.    We greatly appreciate the opportunity to serve you and thank you for trusting us with your health care.      Your health care team at [facility]         Sincerely,        Ludin Chicas MD, MD

## 2019-09-03 NOTE — TELEPHONE ENCOUNTER
"Requested Prescriptions   Pending Prescriptions Disp Refills     citalopram (CELEXA) 40 MG tablet [Pharmacy Med Name: CITALOPRAM 40MG TABLETS] 30 tablet 0     Sig: TAKE ONE TABLET BY MOUTH DAILY  Last Written Prescription Date:  7/17/2019  Last Fill Quantity: 30 tablet,  # refills: 0   Last Office Visit: 11/14/2018   Future Office Visit:            SSRIs Protocol Failed - 9/2/2019  4:33 AM        Failed - PHQ-9 score less than 5 in past 6 months     Please review last PHQ-9 score.   PHQ-9 SCORE 5/15/2018 5/15/2018 11/14/2018   PHQ-9 Total Score - - -   PHQ-9 Total Score MyChart - 6 (Mild depression) -   PHQ-9 Total Score 6 6 4     MANUEL-7 SCORE 5/15/2018 5/15/2018 11/14/2018   Total Score - 3 (minimal anxiety) -   Total Score 3 3 2           Failed - Recent (6 mo) or future (30 days) visit within the authorizing provider's specialty     Patient had office visit in the last 6 months or has a visit in the next 30 days with authorizing provider or within the authorizing provider's specialty.  See \"Patient Info\" tab in inbasket, or \"Choose Columns\" in Meds & Orders section of the refill encounter.            Passed - Medication is active on med list        Passed - Patient is age 18 or older          "

## 2019-09-04 RX ORDER — CITALOPRAM HYDROBROMIDE 40 MG/1
TABLET ORAL
Qty: 30 TABLET | Refills: 0 | Status: SHIPPED | OUTPATIENT
Start: 2019-09-04 | End: 2019-10-02

## 2019-09-04 NOTE — TELEPHONE ENCOUNTER
Routing refill request to provider for review/approval because:  Natali given x1 and patient did not follow up, please advise

## 2019-10-02 DIAGNOSIS — F41.1 GENERALIZED ANXIETY DISORDER: ICD-10-CM

## 2019-10-02 DIAGNOSIS — F33.1 MAJOR DEPRESSIVE DISORDER, RECURRENT EPISODE, MODERATE (H): ICD-10-CM

## 2019-10-03 NOTE — TELEPHONE ENCOUNTER
"Requested Prescriptions   Pending Prescriptions Disp Refills     citalopram (CELEXA) 40 MG tablet [Pharmacy Med Name: CITALOPRAM 40MG TABLETS] 30 tablet 0     Sig: TAKE ONE TABLET BY MOUTH DAILY  Last Written Prescription Date:  9/4/2019  Last Fill Quantity: 30 tablet,  # refills: 0   Last Office Visit: 11/14/2018   Future Office Visit:            SSRIs Protocol Failed - 10/2/2019  5:07 PM        Failed - PHQ-9 score less than 5 in past 6 months     Please review last PHQ-9 score.   PHQ-9 SCORE 5/15/2018 5/15/2018 11/14/2018   PHQ-9 Total Score - - -   PHQ-9 Total Score MyChart - 6 (Mild depression) -   PHQ-9 Total Score 6 6 4     MANUEL-7 SCORE 5/15/2018 5/15/2018 11/14/2018   Total Score - 3 (minimal anxiety) -   Total Score 3 3 2           Failed - Recent (6 mo) or future (30 days) visit within the authorizing provider's specialty     Patient had office visit in the last 6 months or has a visit in the next 30 days with authorizing provider or within the authorizing provider's specialty.  See \"Patient Info\" tab in inbasket, or \"Choose Columns\" in Meds & Orders section of the refill encounter.            Passed - Medication is active on med list        Passed - Patient is age 18 or older          "

## 2019-10-05 NOTE — TELEPHONE ENCOUNTER
Noted on 9/4/19-  uLdin Chicas MD       4:41 PM   Note      Refill for a month.  Office visit required before further refill.

## 2019-10-07 RX ORDER — CITALOPRAM HYDROBROMIDE 40 MG/1
TABLET ORAL
Qty: 15 TABLET | Refills: 0 | Status: SHIPPED | OUTPATIENT
Start: 2019-10-07 | End: 2019-10-23

## 2019-10-08 NOTE — TELEPHONE ENCOUNTER
Tried calling patient few times VM continues to be full & mailed letter in the past already as well

## 2019-10-23 ENCOUNTER — OFFICE VISIT (OUTPATIENT)
Dept: FAMILY MEDICINE | Facility: CLINIC | Age: 34
End: 2019-10-23
Payer: COMMERCIAL

## 2019-10-23 VITALS
HEART RATE: 72 BPM | BODY MASS INDEX: 47.33 KG/M2 | TEMPERATURE: 97.9 F | OXYGEN SATURATION: 97 % | WEIGHT: 315 LBS | SYSTOLIC BLOOD PRESSURE: 120 MMHG | DIASTOLIC BLOOD PRESSURE: 90 MMHG

## 2019-10-23 DIAGNOSIS — L29.9 EAR ITCH: ICD-10-CM

## 2019-10-23 DIAGNOSIS — F33.1 MAJOR DEPRESSIVE DISORDER, RECURRENT EPISODE, MODERATE (H): ICD-10-CM

## 2019-10-23 DIAGNOSIS — F41.1 GENERALIZED ANXIETY DISORDER: ICD-10-CM

## 2019-10-23 PROCEDURE — 99214 OFFICE O/P EST MOD 30 MIN: CPT | Performed by: FAMILY MEDICINE

## 2019-10-23 RX ORDER — CITALOPRAM HYDROBROMIDE 40 MG/1
40 TABLET ORAL DAILY
Qty: 90 TABLET | Refills: 1 | Status: SHIPPED | OUTPATIENT
Start: 2019-10-23 | End: 2020-08-08

## 2019-10-23 ASSESSMENT — ANXIETY QUESTIONNAIRES
4. TROUBLE RELAXING: SEVERAL DAYS
1. FEELING NERVOUS, ANXIOUS, OR ON EDGE: SEVERAL DAYS
GAD7 TOTAL SCORE: 5
6. BECOMING EASILY ANNOYED OR IRRITABLE: SEVERAL DAYS
2. NOT BEING ABLE TO STOP OR CONTROL WORRYING: SEVERAL DAYS
7. FEELING AFRAID AS IF SOMETHING AWFUL MIGHT HAPPEN: NOT AT ALL
5. BEING SO RESTLESS THAT IT IS HARD TO SIT STILL: NOT AT ALL
IF YOU CHECKED OFF ANY PROBLEMS ON THIS QUESTIONNAIRE, HOW DIFFICULT HAVE THESE PROBLEMS MADE IT FOR YOU TO DO YOUR WORK, TAKE CARE OF THINGS AT HOME, OR GET ALONG WITH OTHER PEOPLE: SOMEWHAT DIFFICULT
3. WORRYING TOO MUCH ABOUT DIFFERENT THINGS: SEVERAL DAYS

## 2019-10-23 ASSESSMENT — PATIENT HEALTH QUESTIONNAIRE - PHQ9: SUM OF ALL RESPONSES TO PHQ QUESTIONS 1-9: 6

## 2019-10-23 NOTE — PROGRESS NOTES
Subjective     Jose Cabello is a 33 year old male who presents to clinic today for the following health issues:    HPI   Depression Followup    How are you doing with your depression since your last visit? No change    Are you having other symptoms that might be associated with depression? Yes:  anxiety     Have you had a significant life event?  No     Are you feeling anxious or having panic attacks?   Yes:  anxious     Do you have any concerns with your use of alcohol or other drugs? No    Social History     Tobacco Use     Smoking status: Former Smoker     Types: Cigarettes     Last attempt to quit: 2010     Years since quittin.8     Smokeless tobacco: Never Used   Substance Use Topics     Alcohol use: No     Drug use: No     PHQ 5/15/2018 5/15/2018 2018   PHQ-9 Total Score 6 6 4   Q9: Thoughts of better off dead/self-harm past 2 weeks Not at all Not at all Not at all     MANUEL-7 SCORE 5/15/2018 5/15/2018 2018   Total Score - 3 (minimal anxiety) -   Total Score 3 3 2     Suicide Assessment Five-step Evaluation and Treatment (SAFE-T)      How many servings of fruits and vegetables do you eat daily?  0-1    On average, how many sweetened beverages do you drink each day (soda, juice, sweet tea, etc)?   0  How many days per week do you miss taking your medication? 1    What makes it hard for you to take your medications?  remembering to take    He is doing well.   No side effects.   Concerned about wax build up. Ears are itchy. No pain or drainage.     Reviewed and updated as needed this visit by Provider         Review of Systems   ROS COMP: Constitutional, HEENT, cardiovascular, pulmonary, gi and gu systems are negative, except as otherwise noted.      Objective    There were no vitals taken for this visit.  There is no height or weight on file to calculate BMI.  Physical Exam   BP (!) 120/90 (BP Location: Right arm, Patient Position: Sitting, Cuff Size: Adult Large)   Pulse 72   Temp 97.9   F (36.6  C) (Oral)   Wt (!) 158.3 kg (349 lb)   SpO2 97%   BMI 47.33 kg/m    GENERAL: healthy, alert and no distress  EYES: Eyes grossly normal to inspection  HENT: nose and mouth without ulcers or lesions  PSYCH: mentation appears normal, affect normal    Diagnostic Test Results:  none         Assessment & Plan     1. Generalized anxiety disorder  PHQ-9 SCORE 5/15/2018 11/14/2018 10/23/2019   PHQ-9 Total Score - - -   PHQ-9 Total Score MyChart 6 (Mild depression) - -   PHQ-9 Total Score 6 4 6     - citalopram (CELEXA) 40 MG tablet; Take 1 tablet (40 mg) by mouth daily  Dispense: 90 tablet; Refill: 1    2. Major depressive disorder, recurrent episode, moderate (H)  MANUEL-7 SCORE 5/15/2018 11/14/2018 10/23/2019   Total Score 3 (minimal anxiety) - -   Total Score 3 2 5       - citalopram (CELEXA) 40 MG tablet; Take 1 tablet (40 mg) by mouth daily  Dispense: 90 tablet; Refill: 1    3. Ear itch  - minimal wax and no irrigation needed  - continue to monitor     Return in about 6 months (around 4/23/2020) for Routine Visit.    Felecia Maguier MD  [facility]

## 2019-10-24 ASSESSMENT — ANXIETY QUESTIONNAIRES: GAD7 TOTAL SCORE: 5

## 2020-08-02 DIAGNOSIS — F33.1 MAJOR DEPRESSIVE DISORDER, RECURRENT EPISODE, MODERATE (H): ICD-10-CM

## 2020-08-02 DIAGNOSIS — F41.1 GENERALIZED ANXIETY DISORDER: ICD-10-CM

## 2020-08-02 NOTE — LETTER
August 8, 2020      Jose Cabello  [address] [place] [address]        Nael Garcia,        We received a refill request for citalopram (CELEXA) 40 MG tablet; however, you are due for a medication visit to receive a refill.        In order to keep all patients and staff safe during the COVID-19 pandemic, we ask that you schedule a virtual visit which includes a video or telephone visit with your provider.   Please call us at [phone] at your earliest convenience to schedule an appointment.       We greatly appreciate the opportunity to serve you and thank you for trusting us with your health care.        Your health care team at [facility]                 Sincerely,        Felecia Maguire MD

## 2020-08-08 RX ORDER — CITALOPRAM HYDROBROMIDE 40 MG/1
40 TABLET ORAL DAILY
Qty: 30 TABLET | Refills: 0 | Status: SHIPPED | OUTPATIENT
Start: 2020-08-08 | End: 2020-09-01

## 2020-08-08 NOTE — TELEPHONE ENCOUNTER
Medication is being filled for 1 time refill only due to:  Patient needs to be seen because need recheck\.

## 2020-08-08 NOTE — TELEPHONE ENCOUNTER
LM to call back and schedule a med check visit. Pt has no active MyChart. Sent letter.    Amelia GARCIA  [facility]      Routing to nurse team due to pending medication.

## 2020-09-01 ENCOUNTER — VIRTUAL VISIT (OUTPATIENT)
Dept: FAMILY MEDICINE | Facility: CLINIC | Age: 35
End: 2020-09-01
Payer: COMMERCIAL

## 2020-09-01 DIAGNOSIS — F41.1 GENERALIZED ANXIETY DISORDER: ICD-10-CM

## 2020-09-01 DIAGNOSIS — F33.1 MAJOR DEPRESSIVE DISORDER, RECURRENT EPISODE, MODERATE (H): ICD-10-CM

## 2020-09-01 PROCEDURE — 99214 OFFICE O/P EST MOD 30 MIN: CPT | Mod: 95 | Performed by: FAMILY MEDICINE

## 2020-09-01 PROCEDURE — 96127 BRIEF EMOTIONAL/BEHAV ASSMT: CPT | Performed by: FAMILY MEDICINE

## 2020-09-01 RX ORDER — CITALOPRAM HYDROBROMIDE 40 MG/1
40 TABLET ORAL DAILY
Qty: 90 TABLET | Refills: 1 | Status: SHIPPED | OUTPATIENT
Start: 2020-09-01 | End: 2021-02-23

## 2020-09-01 ASSESSMENT — ANXIETY QUESTIONNAIRES
2. NOT BEING ABLE TO STOP OR CONTROL WORRYING: NOT AT ALL
IF YOU CHECKED OFF ANY PROBLEMS ON THIS QUESTIONNAIRE, HOW DIFFICULT HAVE THESE PROBLEMS MADE IT FOR YOU TO DO YOUR WORK, TAKE CARE OF THINGS AT HOME, OR GET ALONG WITH OTHER PEOPLE: NOT DIFFICULT AT ALL
GAD7 TOTAL SCORE: 1
5. BEING SO RESTLESS THAT IT IS HARD TO SIT STILL: NOT AT ALL
7. FEELING AFRAID AS IF SOMETHING AWFUL MIGHT HAPPEN: NOT AT ALL
3. WORRYING TOO MUCH ABOUT DIFFERENT THINGS: NOT AT ALL
6. BECOMING EASILY ANNOYED OR IRRITABLE: SEVERAL DAYS
1. FEELING NERVOUS, ANXIOUS, OR ON EDGE: NOT AT ALL

## 2020-09-01 ASSESSMENT — PATIENT HEALTH QUESTIONNAIRE - PHQ9
SUM OF ALL RESPONSES TO PHQ QUESTIONS 1-9: 3
5. POOR APPETITE OR OVEREATING: NOT AT ALL

## 2020-09-01 NOTE — PROGRESS NOTES
"Jose Cabello is a 34 year old male who is being evaluated via a billable telephone visit.      The patient has been notified of following:     \"This telephone visit will be conducted via a call between you and your physician/provider. We have found that certain health care needs can be provided without the need for a physical exam.  This service lets us provide the care you need with a short phone conversation.  If a prescription is necessary we can send it directly to your pharmacy.  If lab work is needed we can place an order for that and you can then stop by our lab to have the test done at a later time.    Telephone visits are billed at different rates depending on your insurance coverage. During this emergency period, for some insurers they may be billed the same as an in-person visit.  Please reach out to your insurance provider with any questions.    If during the course of the call the physician/provider feels a telephone visit is not appropriate, you will not be charged for this service.\"    Patient has given verbal consent for Telephone visit?  Yes    What phone number would you like to be contacted at? [phone]    How would you like to obtain your AVS? Mail a copy    Subjective     Jose Cabello is a 34 year old male who presents via phone visit today for the following health issues:    HPI    Depression and Anxiety Follow-Up    How are you doing with your depression since your last visit? No change    How are you doing with your anxiety since your last visit?  No change    Are you having other symptoms that might be associated with depression or anxiety? No    Have you had a significant life event? No     Do you have any concerns with your use of alcohol or other drugs? No    Social History     Tobacco Use     Smoking status: Former Smoker     Types: Cigarettes     Last attempt to quit: 2010     Years since quittin.6     Smokeless tobacco: Never Used   Substance Use Topics     Alcohol " use: No     Drug use: No     PHQ 5/15/2018 11/14/2018 10/23/2019   PHQ-9 Total Score 6 4 6   Q9: Thoughts of better off dead/self-harm past 2 weeks Not at all Not at all Not at all     MANUEL-7 SCORE 5/15/2018 11/14/2018 10/23/2019   Total Score 3 (minimal anxiety) - -   Total Score 3 2 5     Last PHQ-9 9/1/2020   1.  Little interest or pleasure in doing things 1   2.  Feeling down, depressed, or hopeless 1   3.  Trouble falling or staying asleep, or sleeping too much 0   4.  Feeling tired or having little energy 1   5.  Poor appetite or overeating 0   6.  Feeling bad about yourself 0   7.  Trouble concentrating 0   8.  Moving slowly or restless 0   Q9: Thoughts of better off dead/self-harm past 2 weeks 0   PHQ-9 Total Score 3   Difficulty at work, home, or with people Not difficult at all     MANUEL-7  9/1/2020   1. Feeling nervous, anxious, or on edge 0   2. Not being able to stop or control worrying 0   3. Worrying too much about different things 0   4. Trouble relaxing 0   5. Being so restless that it is hard to sit still 0   6. Becoming easily annoyed or irritable 1   7. Feeling afraid, as if something awful might happen 0   MANUEL-7 Total Score 1   If you checked any problems, how difficult have they made it for you to do your work, take care of things at home, or get along with other people? Not difficult at all     Suicide Assessment Five-step Evaluation and Treatment (SAFE-T)    Working from home. Taking care of his mother and aunt.   No sick contact.   Some boredom, irritability.   Minimal depression and anxiety. Sometime goes out to improve his mood. Seeing a therapist for years.       Review of Systems   Constitutional, HEENT, cardiovascular, pulmonary, gi and gu systems are negative, except as otherwise noted.       Objective   Vitals - Patient Reported  Weight (Patient Reported): 156 kg (344 lb)  Height (Patient Reported): 182.9 cm (6')  BMI (Based on Pt Reported Ht/Wt): 46.65        healthy, alert and no  distress  PSYCH: Alert and oriented times 3; coherent speech, normal   rate and volume, able to articulate logical thoughts, able   to abstract reason, no tangential thoughts, no hallucinations   or delusions  His affect is normal  RESP: No cough, no audible wheezing, able to talk in full sentences  Remainder of exam unable to be completed due to telephone visits        Assessment/Plan:    Assessment & Plan     Generalized anxiety disorder  Doing well. Some irritability. Continue with therapy. Not interested in medication change. Continue to work on lifestyle.   - citalopram (CELEXA) 40 MG tablet; Take 1 tablet (40 mg) by mouth daily    Major depressive disorder, recurrent episode, moderate (H)   Patient is tolerating current medication without any major side effects of concerns and current dose seems reasonable too.  Current medication regime is effective. Continue current treatment without any changes.   - citalopram (CELEXA) 40 MG tablet; Take 1 tablet (40 mg) by mouth daily     Follow up in 6 months.     Ludin Chicas MD, MD  [facility]    Phone call duration:  10 minutes

## 2020-09-02 ASSESSMENT — ANXIETY QUESTIONNAIRES: GAD7 TOTAL SCORE: 1

## 2021-01-07 ENCOUNTER — OFFICE VISIT (OUTPATIENT)
Dept: URGENT CARE | Facility: URGENT CARE | Age: 36
End: 2021-01-07
Payer: COMMERCIAL

## 2021-01-07 VITALS — WEIGHT: 315 LBS | BODY MASS INDEX: 42.66 KG/M2 | HEIGHT: 72 IN

## 2021-01-07 DIAGNOSIS — H61.23 BILATERAL IMPACTED CERUMEN: Primary | ICD-10-CM

## 2021-01-07 PROCEDURE — 69209 REMOVE IMPACTED EAR WAX UNI: CPT | Mod: 50 | Performed by: NURSE PRACTITIONER

## 2021-01-07 ASSESSMENT — MIFFLIN-ST. JEOR: SCORE: 2532.91

## 2021-01-07 NOTE — NURSING NOTE
Patient identified using two patient identifiers.  Ear exam showing wax occlusion completed by provider.  H202/H20 was placed in the bilateral ear(s) via irrigation tool: elephant ear.    Large amount of wax was removed from bilateral ears without incident.  Argelia Coulter MA

## 2021-01-07 NOTE — PATIENT INSTRUCTIONS
Patient Education     Impacted Earwax     Inner ear structures including ear canal and eardrum.   Impacted earwax is a buildup of the natural wax in the ear. Impacted earwax is very common. It can cause symptoms such as hearing loss. It can also make it hard for a healthcare provider to check your ear.   Understanding earwax  Tiny glands in your ear make substances that combine with dead skin cells to form earwax. Earwax helps protect your ear canal from water, dirt, infection, and injury. Over time, earwax travels from the inner part of your ear canal to the entrance of the canal. Then it falls away naturally. But in some cases, it can t travel to the entrance of the canal. This may be because of a health condition or objects put in the ear. With age, earwax tends to become harder and less fluid. Older adults are more likely to have problems with earwax buildup.   What causes impacted earwax?  Earwax can build up because of many health conditions. Some cause a physical blockage. Others cause too much earwax to be made. Health conditions that can cause earwax buildup include:     Bony blockage in the ear (osteoma or exostoses)    Infections, such as an outer ear infection (external otitis)    Skin disease, such as eczema    Autoimmune diseases, such as lupus    A narrowed ear canal from birth, chronic inflammation, or injury    Too much earwax because of injury    Too much earwax because of  water in the ear canal  Putting objects in the ear again and again can also cause impacted earwax. For example, putting cotton swabs in the ear may push the wax deeper into the ear. Over time, this may cause blockage. Hearing aids, swimming plugs, and swim molds can also cause this problem when used again and again.   In some cases, the cause of impacted earwax is not known.  Symptoms of impacted earwax  Excess earwax often does not cause any symptoms, unless there is a large amount of buildup. Then it may cause symptoms such  as:     Hearing loss    Earache    Sense of ear fullness    Itching in the ear    Odor from the ear    Ear drainage    Dizziness    Ringing in the ears    Cough  Treatment for impacted earwax  If you don t have symptoms, you may not need treatment. Often the earwax goes away on its own with time. If you have symptoms, you may have 1 or more treatments such as:     Ear drops to soften the earwax. This helps it leave the ear over time.    Rinsing the ear canal with water. This is done in a healthcare provider s office.    Removing the earwax with small tools. This is also done in a provider s office.  In rare cases, some treatments for earwax removal may cause complications such as:    Outer ear infection    Earache    Short-term hearing loss    Dizziness    Water trapped in the ear canal    Hole in the eardrum    Ringing in the ears    Bleeding from the ear  Talk with your healthcare provider about which risks apply most to you.  Healthcare providers don't advise using ear candles or ear vacuum kits. These methods are not shown to work and may cause problems.   Preventing impacted earwax  You may not be able to prevent impacted earwax if you have a health condition that causes it, such as eczema. In other cases, you may be able to prevent earwax buildup by:     Using ear drops once a week    Having a regular ear cleaning about every 6 months    Not using cotton swabs in the ear  When to call the healthcare provider  Call your healthcare provider right away if you have:     Symptoms of impacted earwax    Severe symptoms after earwax removal, such as bleeding or severe ear pain    [employer] last reviewed this educational content on 9/1/2019 2000-2020 [employer] - is now [employer]. [address] [place] [address], [place] [address]. All rights reserved. This information is not intended as a substitute for professional medical care. Always follow your healthcare professional's instructions.         Acetaminophen as needed for pain.   May consider using Debrox on a regular basis to prevent further impactions.

## 2021-01-07 NOTE — PROGRESS NOTES
Chief Complaint   Patient presents with     Urgent Care     Pt in clinic c/o plugged ears and discomfort.     Ear Problem         ICD-10-CM    1. Bilateral impacted cerumen  H61.23        {Provider  Link to [facility] Help Grid :1503    Differential Diagnosis:  Otitis media, tympanic membrane rupture, cerumen impaction, otitis externa      Subjective     Jose Cabello is an 35 year oldmale who presents to clinic today for the following health issues bilateral decreased hearing for 1 week.  Patient does have a history of cerumen impaction and believes it was the beginning of last summer when he had his ears irrigated.  He denies any pain or fevers, rashes, or recent travel.        Objective    Pulse (P) 57   Temp (P) 98.7  F (37.1  C) (Oral)   Ht 1.829 m (6')   Wt (!) 156.5 kg (345 lb)   SpO2 (P) 97%   BMI 46.79 kg/m      Body mass index is 46.79 kg/m .    Physical Exam         GENERAL APPEARANCE: healthy appearing, alert     EYES: PERRL, EOMI, sclera non-icteric     HENT: nose and mouth without ulcers or lesions and bilateral cerumen impaction which after being flushed shows normal tympanic membranes.     NECK: no adenopathy or asymmetry, thyroid normal to palpation     SKIN: no suspicious lesions or rashes    Ears irrigated by medical assistant.    VIRGIE Bo, CNP  [facility] Provider    [unfilled]

## 2021-11-09 ENCOUNTER — OFFICE VISIT (OUTPATIENT)
Dept: FAMILY MEDICINE | Facility: CLINIC | Age: 36
End: 2021-11-09
Payer: COMMERCIAL

## 2021-11-09 VITALS
RESPIRATION RATE: 16 BRPM | OXYGEN SATURATION: 96 % | WEIGHT: 315 LBS | SYSTOLIC BLOOD PRESSURE: 128 MMHG | HEART RATE: 90 BPM | DIASTOLIC BLOOD PRESSURE: 82 MMHG | HEIGHT: 72 IN | BODY MASS INDEX: 42.66 KG/M2 | TEMPERATURE: 98.2 F

## 2021-11-09 DIAGNOSIS — Z13.220 SCREENING FOR HYPERLIPIDEMIA: ICD-10-CM

## 2021-11-09 DIAGNOSIS — N47.1 TIGHT FORESKIN: ICD-10-CM

## 2021-11-09 DIAGNOSIS — E66.01 MORBID OBESITY (H): ICD-10-CM

## 2021-11-09 DIAGNOSIS — F41.1 GENERALIZED ANXIETY DISORDER: ICD-10-CM

## 2021-11-09 DIAGNOSIS — F33.1 MAJOR DEPRESSIVE DISORDER, RECURRENT EPISODE, MODERATE (H): ICD-10-CM

## 2021-11-09 DIAGNOSIS — Z00.00 ROUTINE GENERAL MEDICAL EXAMINATION AT A HEALTH CARE FACILITY: Primary | ICD-10-CM

## 2021-11-09 DIAGNOSIS — Z13.1 SCREENING FOR DIABETES MELLITUS: ICD-10-CM

## 2021-11-09 DIAGNOSIS — R51.9 ACUTE NONINTRACTABLE HEADACHE, UNSPECIFIED HEADACHE TYPE: ICD-10-CM

## 2021-11-09 PROCEDURE — 82947 ASSAY GLUCOSE BLOOD QUANT: CPT | Performed by: FAMILY MEDICINE

## 2021-11-09 PROCEDURE — 99213 OFFICE O/P EST LOW 20 MIN: CPT | Mod: 25 | Performed by: FAMILY MEDICINE

## 2021-11-09 PROCEDURE — 99395 PREV VISIT EST AGE 18-39: CPT | Performed by: FAMILY MEDICINE

## 2021-11-09 PROCEDURE — 36415 COLL VENOUS BLD VENIPUNCTURE: CPT | Performed by: FAMILY MEDICINE

## 2021-11-09 PROCEDURE — 80061 LIPID PANEL: CPT | Performed by: FAMILY MEDICINE

## 2021-11-09 PROCEDURE — 96127 BRIEF EMOTIONAL/BEHAV ASSMT: CPT | Performed by: FAMILY MEDICINE

## 2021-11-09 RX ORDER — CITALOPRAM HYDROBROMIDE 40 MG/1
40 TABLET ORAL DAILY
Qty: 90 TABLET | Refills: 3 | Status: SHIPPED | OUTPATIENT
Start: 2021-11-09 | End: 2022-11-23

## 2021-11-09 SDOH — HEALTH STABILITY: PHYSICAL HEALTH: ON AVERAGE, HOW MANY MINUTES DO YOU ENGAGE IN EXERCISE AT THIS LEVEL?: 10 MIN

## 2021-11-09 SDOH — ECONOMIC STABILITY: TRANSPORTATION INSECURITY
IN THE PAST 12 MONTHS, HAS THE LACK OF TRANSPORTATION KEPT YOU FROM MEDICAL APPOINTMENTS OR FROM GETTING MEDICATIONS?: NO

## 2021-11-09 SDOH — HEALTH STABILITY: PHYSICAL HEALTH: ON AVERAGE, HOW MANY DAYS PER WEEK DO YOU ENGAGE IN MODERATE TO STRENUOUS EXERCISE (LIKE A BRISK WALK)?: 1 DAY

## 2021-11-09 SDOH — ECONOMIC STABILITY: INCOME INSECURITY: HOW HARD IS IT FOR YOU TO PAY FOR THE VERY BASICS LIKE FOOD, HOUSING, MEDICAL CARE, AND HEATING?: VERY HARD

## 2021-11-09 SDOH — ECONOMIC STABILITY: TRANSPORTATION INSECURITY
IN THE PAST 12 MONTHS, HAS LACK OF TRANSPORTATION KEPT YOU FROM MEETINGS, WORK, OR FROM GETTING THINGS NEEDED FOR DAILY LIVING?: NO

## 2021-11-09 SDOH — ECONOMIC STABILITY: FOOD INSECURITY: WITHIN THE PAST 12 MONTHS, THE FOOD YOU BOUGHT JUST DIDN'T LAST AND YOU DIDN'T HAVE MONEY TO GET MORE.: NEVER TRUE

## 2021-11-09 SDOH — ECONOMIC STABILITY: INCOME INSECURITY: IN THE LAST 12 MONTHS, WAS THERE A TIME WHEN YOU WERE NOT ABLE TO PAY THE MORTGAGE OR RENT ON TIME?: PATIENT REFUSED

## 2021-11-09 SDOH — ECONOMIC STABILITY: FOOD INSECURITY: WITHIN THE PAST 12 MONTHS, YOU WORRIED THAT YOUR FOOD WOULD RUN OUT BEFORE YOU GOT MONEY TO BUY MORE.: SOMETIMES TRUE

## 2021-11-09 ASSESSMENT — ANXIETY QUESTIONNAIRES
6. BECOMING EASILY ANNOYED OR IRRITABLE: NEARLY EVERY DAY
3. WORRYING TOO MUCH ABOUT DIFFERENT THINGS: NEARLY EVERY DAY
7. FEELING AFRAID AS IF SOMETHING AWFUL MIGHT HAPPEN: SEVERAL DAYS
1. FEELING NERVOUS, ANXIOUS, OR ON EDGE: MORE THAN HALF THE DAYS
IF YOU CHECKED OFF ANY PROBLEMS ON THIS QUESTIONNAIRE, HOW DIFFICULT HAVE THESE PROBLEMS MADE IT FOR YOU TO DO YOUR WORK, TAKE CARE OF THINGS AT HOME, OR GET ALONG WITH OTHER PEOPLE: EXTREMELY DIFFICULT
2. NOT BEING ABLE TO STOP OR CONTROL WORRYING: NEARLY EVERY DAY
5. BEING SO RESTLESS THAT IT IS HARD TO SIT STILL: SEVERAL DAYS
GAD7 TOTAL SCORE: 14

## 2021-11-09 ASSESSMENT — SOCIAL DETERMINANTS OF HEALTH (SDOH)
HOW OFTEN DO YOU GET TOGETHER WITH FRIENDS OR RELATIVES?: NEVER
IN A TYPICAL WEEK, HOW MANY TIMES DO YOU TALK ON THE PHONE WITH FAMILY, FRIENDS, OR NEIGHBORS?: ONCE A WEEK
DO YOU BELONG TO ANY CLUBS OR ORGANIZATIONS SUCH AS CHURCH GROUPS UNIONS, FRATERNAL OR ATHLETIC GROUPS, OR SCHOOL GROUPS?: NO
ARE YOU MARRIED, WIDOWED, DIVORCED, SEPARATED, NEVER MARRIED, OR LIVING WITH A PARTNER?: NEVER MARRIED
HOW OFTEN DO YOU ATTEND CHURCH OR RELIGIOUS SERVICES?: NEVER

## 2021-11-09 ASSESSMENT — ENCOUNTER SYMPTOMS
DYSURIA: 0
MYALGIAS: 0
FEVER: 0
PALPITATIONS: 0
SORE THROAT: 0
EYE PAIN: 0
HEADACHES: 1
NERVOUS/ANXIOUS: 1
DIARRHEA: 0
CONSTIPATION: 0
CHILLS: 0
ARTHRALGIAS: 0
NAUSEA: 0
ABDOMINAL PAIN: 0
HEMATURIA: 0
WEAKNESS: 0
HEARTBURN: 0
HEMATOCHEZIA: 0
COUGH: 0
PARESTHESIAS: 0
FREQUENCY: 0
DIZZINESS: 0
JOINT SWELLING: 0
SHORTNESS OF BREATH: 1

## 2021-11-09 ASSESSMENT — PATIENT HEALTH QUESTIONNAIRE - PHQ9
SUM OF ALL RESPONSES TO PHQ QUESTIONS 1-9: 12
10. IF YOU CHECKED OFF ANY PROBLEMS, HOW DIFFICULT HAVE THESE PROBLEMS MADE IT FOR YOU TO DO YOUR WORK, TAKE CARE OF THINGS AT HOME, OR GET ALONG WITH OTHER PEOPLE: VERY DIFFICULT
5. POOR APPETITE OR OVEREATING: SEVERAL DAYS
SUM OF ALL RESPONSES TO PHQ QUESTIONS 1-9: 12

## 2021-11-09 ASSESSMENT — LIFESTYLE VARIABLES
HOW OFTEN DO YOU HAVE SIX OR MORE DRINKS ON ONE OCCASION: NEVER
HOW OFTEN DO YOU HAVE A DRINK CONTAINING ALCOHOL: NEVER
HOW MANY STANDARD DRINKS CONTAINING ALCOHOL DO YOU HAVE ON A TYPICAL DAY: PATIENT DECLINED

## 2021-11-09 ASSESSMENT — MIFFLIN-ST. JEOR: SCORE: 2573.73

## 2021-11-09 NOTE — PROGRESS NOTES
SUBJECTIVE:   CC: Jose Cabello is an 35 year old male who presents for preventative health visit.     Patient has been advised of split billing requirements and indicates understanding: Yes  Healthy Habits:     Getting at least 3 servings of Calcium per day:  NO    Bi-annual eye exam:  NO    Dental care twice a year:  NO    Sleep apnea or symptoms of sleep apnea:  Daytime drowsiness and Excessive snoring    Diet:  Regular (no restrictions)    Frequency of exercise:  1 day/week    Duration of exercise:  Less than 15 minutes    Taking medications regularly:  Yes    Medication side effects:  Other    PHQ-2 Total Score: 4    Additional concerns today:  No    Depression and Anxiety Follow-Up    How are you doing with your depression since your last visit? Worsened     How are you doing with your anxiety since your last visit?  Worsened     Are you having other symptoms that might be associated with depression or anxiety? No    Have you had a significant life event? OTHER: applying for social secuirty      Do you have any concerns with your use of alcohol or other drugs? No    Social History     Tobacco Use     Smoking status: Former Smoker     Types: Cigarettes     Quit date: 12/26/2010     Years since quitting: 10.8     Smokeless tobacco: Never Used   Substance Use Topics     Alcohol use: No     Drug use: No     PHQ 10/23/2019 9/1/2020 11/9/2021   PHQ-9 Total Score 6 3 12   Q9: Thoughts of better off dead/self-harm past 2 weeks Not at all Not at all Not at all     MANUEL-7 SCORE 10/23/2019 9/1/2020 11/9/2021   Total Score - - -   Total Score 5 1 14     Last PHQ-9 11/9/2021   1.  Little interest or pleasure in doing things 2   2.  Feeling down, depressed, or hopeless 2   3.  Trouble falling or staying asleep, or sleeping too much 2   4.  Feeling tired or having little energy 2   5.  Poor appetite or overeating 2   6.  Feeling bad about yourself 1   7.  Trouble concentrating 1   8.  Moving slowly or restless 0   Q9:  Thoughts of better off dead/self-harm past 2 weeks 0   PHQ-9 Total Score 12   Difficulty at work, home, or with people -     MANUEL-7  11/9/2021   1. Feeling nervous, anxious, or on edge 2   2. Not being able to stop or control worrying 3   3. Worrying too much about different things 3   4. Trouble relaxing 1   5. Being so restless that it is hard to sit still 1   6. Becoming easily annoyed or irritable 3   7. Feeling afraid, as if something awful might happen 1   MANUEL-7 Total Score 14   If you checked any problems, how difficult have they made it for you to do your work, take care of things at home, or get along with other people? Extremely difficult     Suicide Assessment Five-step Evaluation and Treatment (SAFE-T)       Today's PHQ-2 Score:   PHQ-2 ( 1999 Pf[employer]) 11/9/2021   Q1: Little interest or pleasure in doing things 2   Q2: Feeling down, depressed or hopeless 2   PHQ-2 Score 4   Q1: Little interest or pleasure in doing things More than half the days   Q2: Feeling down, depressed or hopeless More than half the days   PHQ-2 Score 4     Abuse: Current or Past(Physical, Sexual or Emotional)- No  Do you feel safe in your environment? Yes    Have you ever done Advance Care Planning? (For example, a Health Directive, POLST, or a discussion with a medical provider or your loved ones about your wishes): No, advance care planning information given to patient to review.  Advanced care planning was discussed at today's visit.    Social History     Tobacco Use     Smoking status: Former Smoker     Types: Cigarettes     Quit date: 12/26/2010     Years since quitting: 10.8     Smokeless tobacco: Never Used   Substance Use Topics     Alcohol use: No     If you drink alcohol do you typically have >3 drinks per day or >7 drinks per week? No    Alcohol Use 11/9/2021   Prescreen: >3 drinks/day or >7 drinks/week? Not Applicable   Prescreen: >3 drinks/day or >7 drinks/week? -   No flowsheet data found.    Last PSA: No results found  for: PSA    Reviewed orders with patient. Reviewed health maintenance and updated orders accordingly - Yes       Reviewed and updated as needed this visit by clinical staff  Tobacco  Allergies  Meds   Med Hx  Surg Hx  Fam Hx  Soc Hx     Reviewed and updated as needed this visit by Provider    More stress and anxiety during last year. Seeing a therapist for 7 yrs or so every 2 weeks.   Applying for social security benefit as no job.   Living with mother and aunt. No concern of safety.   No specific exercise regime.   Does sometime home chores. Admits his weight is high.   Headaches regularly. No change in vision. Wears glasses. Last eye exam 2 yrs ago. Sometime headache on side but mostly all over head. Does not think it is necessary to see specialist. Will see an eye doctor soon.   Sometime blood in stool.  Not sexually active. fooreskin - when not penile erection - able to retract but when erection - hard to erect. Able to clean with taking shower.     Review of Systems   Constitutional: Negative for chills and fever.   HENT: Negative for congestion, ear pain, hearing loss and sore throat.    Eyes: Negative for pain and visual disturbance.   Respiratory: Positive for shortness of breath. Negative for cough.    Cardiovascular: Negative for chest pain, palpitations and peripheral edema.   Gastrointestinal: Negative for abdominal pain, constipation, diarrhea, heartburn, hematochezia and nausea.   Genitourinary: Negative for dysuria, frequency, genital sores, hematuria, impotence, penile discharge and urgency.   Musculoskeletal: Negative for arthralgias, joint swelling and myalgias.   Skin: Negative for rash.   Neurological: Positive for headaches. Negative for dizziness, weakness and paresthesias.   Psychiatric/Behavioral: Positive for mood changes. The patient is nervous/anxious.      OBJECTIVE:   /82 (BP Location: Right arm, Patient Position: Sitting, Cuff Size: Adult Large)   Pulse 90   Temp 98.2  F  (36.8  C) (Oral)   Resp 16   Ht 1.829 m (6')   Wt (!) 160.6 kg (354 lb)   SpO2 96%   BMI 48.01 kg/m      Physical Exam  GENERAL: healthy, alert and no distress  EYES: Eyes grossly normal to inspection, PERRL and conjunctivae and sclerae normal  HENT: ear canals and TM's normal, nose and mouth without ulcers or lesions  NECK: no adenopathy, no asymmetry, masses, or scars and thyroid normal to palpation  RESP: lungs clear to auscultation - no rales, rhonchi or wheezes  CV: regular rate and rhythm, normal S1 S2, no S3 or S4, no murmur, click or rub, no peripheral edema and peripheral pulses strong  ABDOMEN: morbidly obese, soft, nontender, no hepatosplenomegaly, no masses and bowel sounds normal   (male): normal male genitalia without lesions or urethral discharge, no hernia  MS: no gross musculoskeletal defects noted, no edema  SKIN: seborrhea on face and acne present  NEURO: Normal strength and tone, mentation intact and speech normal  PSYCH: mentation appears normal, affect normal/bright         ASSESSMENT/PLAN:   (Z00.00) Routine general medical examination at a health care facility  (primary encounter diagnosis)  Comment:    Plan: Declined immunization today.  Not due for Covid booster until February of next year.    (E66.01) Morbid obesity (H)  Comment:  Body mass index is 48.01 kg/m .  Plan: Offered weight loss clinic referral.  He declined.  He will notify me if he is interested.    (F33.1) Major depressive disorder, recurrent episode, moderate (H)  Comment: Mood is worse compared to last time but he does not feel the need to adjust his Celexa dose.  He is seeing a therapist regularly continue the same.  Denies any suicidal thoughts.  Plan: citalopram (CELEXA) 40 MG tablet             (F41.1) Generalized anxiety disorder  Comment:  See above.   Plan: citalopram (CELEXA) 40 MG tablet             (Z13.1) Screening for diabetes mellitus  Comment:    Plan: Glucose             (Z13.220) Screening for  hyperlipidemia  Comment:    Plan: Lipid panel reflex to direct LDL Fasting             (R51.9) Acute nonintractable headache, unspecified headache type  Comment:     Plan: Going on for some time.  He is going to have his vision checked but if it is persistent call for neurology referral.  Denies any serious symptoms.  No family history of migraine.  Symptoms are more consistent with tension type of headache.    (N47.1) Tight foreskin  Comment:    Plan: Able to retract it when not wearing erection but unable to take it when an erection.  Currently not sexually active and does not think he needs intervention.  Discussed to keep area clean and dry.  Offered topical steroid which she declined.  If interested call for urology referral.    Patient has been advised of split billing requirements and indicates understanding: Yes  COUNSELING:   Reviewed preventive health counseling, as reflected in patient instructions  Special attention given to:        Regular exercise       Healthy diet/nutrition       Vision screening       Hearing screening    Estimated body mass index is 46.79 kg/m  as calculated from the following:    Height as of 1/7/21: 1.829 m (6').    Weight as of 1/7/21: 156.5 kg (345 lb).     Weight management plan: see above    He reports that he quit smoking about 10 years ago. His smoking use included cigarettes. He has never used smokeless tobacco.      Counseling Resources:  ATP IV Guidelines  Pooled Cohorts Equation Calculator  FRAX Risk Assessment  ICSI Preventive Guidelines  Dietary Guidelines for Americans, 2010  USDA's MyPlate  ASA Prophylaxis  Lung CA Screening    Ludin Chicas MD, MD  [facility]  Answers for HPI/ROS submitted by the patient on 11/9/2021  If you checked off any problems, how difficult have these problems made it for you to do your work, take care of things at home, or get along with other people?: Very difficult  PHQ9 TOTAL SCORE: 12

## 2021-11-09 NOTE — LETTER
November 16, 2021      Jose Cabello  [address] [place] [address]        Dear ,    We are writing to inform you of your test results.    Your good cholesterol (HDL) is low, which improves with exercise.   The triglycerides are high. Lowering  the amount of sugar ,alcohol and sweets in the diet helps to control this.Exercise and weight loss helps.     Diabetes test is within normal limit.     Resulted Orders   Lipid panel reflex to direct LDL Fasting   Result Value Ref Range    Cholesterol 235 (H) <200 mg/dL    Triglycerides 294 (H) <150 mg/dL    Direct Measure HDL 38 (L) >=40 mg/dL    LDL Cholesterol Calculated 138 (H) <=100 mg/dL    Non HDL Cholesterol 197 (H) <130 mg/dL    Patient Fasting > 8hrs? Yes     Narrative    Cholesterol  Desirable:  <200 mg/dL    Triglycerides  Normal:  Less than 150 mg/dL  Borderline High:  150-199 mg/dL  High:  200-499 mg/dL  Very High:  Greater than or equal to 500 mg/dL    Direct Measure HDL  Female:  Greater than or equal to 50 mg/dL   Male:  Greater than or equal to 40 mg/dL    LDL Cholesterol  Desirable:  <100mg/dL  Above Desirable:  100-129 mg/dL   Borderline High:  130-159 mg/dL   High:  160-189 mg/dL   Very High:  >= 190 mg/dL    Non HDL Cholesterol  Desirable:  130 mg/dL  Above Desirable:  130-159 mg/dL  Borderline High:  160-189 mg/dL  High:  190-219 mg/dL  Very High:  Greater than or equal to 220 mg/dL   Glucose   Result Value Ref Range    Glucose 87 70 - 99 mg/dL    Patient Fasting > 8hrs? Yes        If you have any questions or concerns, please call the clinic at the number listed above.       Sincerely,      Ludin Chicas MD

## 2021-11-10 LAB
CHOLEST SERPL-MCNC: 235 MG/DL
FASTING STATUS PATIENT QL REPORTED: YES
FASTING STATUS PATIENT QL REPORTED: YES
GLUCOSE BLD-MCNC: 87 MG/DL (ref 70–99)
HDLC SERPL-MCNC: 38 MG/DL
LDLC SERPL CALC-MCNC: 138 MG/DL
NONHDLC SERPL-MCNC: 197 MG/DL
TRIGL SERPL-MCNC: 294 MG/DL

## 2021-11-10 ASSESSMENT — ANXIETY QUESTIONNAIRES: GAD7 TOTAL SCORE: 14

## 2021-11-10 ASSESSMENT — PATIENT HEALTH QUESTIONNAIRE - PHQ9: SUM OF ALL RESPONSES TO PHQ QUESTIONS 1-9: 12

## 2022-11-22 DIAGNOSIS — F33.1 MAJOR DEPRESSIVE DISORDER, RECURRENT EPISODE, MODERATE (H): ICD-10-CM

## 2022-11-22 DIAGNOSIS — F41.1 GENERALIZED ANXIETY DISORDER: ICD-10-CM

## 2022-11-23 RX ORDER — CITALOPRAM HYDROBROMIDE 40 MG/1
TABLET ORAL
Qty: 90 TABLET | Refills: 0 | Status: SHIPPED | OUTPATIENT
Start: 2022-11-23 | End: 2023-01-09

## 2022-11-23 NOTE — TELEPHONE ENCOUNTER
Medication Question or Refill    Contacts       Type Contact Phone/Fax    11/22/2022 01:31 AM CST Interface (Incoming) [facility] [address] [place] [address] [phone]          What medication are you calling about (include dose and sig)?: Citalopram 40Mg    Controlled Substance Agreement on file:   CSA -- Patient Level:    CSA: None found at the patient level.       Who prescribed the medication?: Dr. Chicas    Do you need a refill? Yes: Completely out     When did you use the medication last? Monday night    Patient offered an appointment? Yes: Was scheduled 1/9/23 for annual    Do you have any questions or concerns?  Yes: Hoping for bridge rx    Preferred Pharmacy:    [facility] [address] [place] [id number]  Phone: [phone] Fax: [phone]      Okay to leave a detailed message?: Yes at Home number on file [phone] (home)

## 2022-11-23 NOTE — TELEPHONE ENCOUNTER
Dr. Chicas-Please review, sign if agree and may close encounter.  Patient is scheduled with you on 1/9/23.    Routing refill request to provider for review/approval because:  PHQ-9 > 4    PHQ 10/23/2019 9/1/2020 11/9/2021   PHQ-9 Total Score 6 3 12   Q9: Thoughts of better off dead/self-harm past 2 weeks Not at all Not at all Not at all     Last Written Prescription Date:  11/9/21  Last Fill Quantity: 90,  # refills: 3   Last office visit: 11/9/2021 with prescribing provider:  Dr. Chicas   Future Office Visit:   Next 5 appointments (look out 90 days)    Jan 09, 2023  4:00 PM  (Arrive by 3:40 PM)  Adult Preventative Visit with Ludin Chicas MD  [facility] ([facility] ) [address] [place] [id number] [phone]           Thank you!  VIDYA ArevaloN, RN-BC  [facility]

## 2023-01-09 ENCOUNTER — OFFICE VISIT (OUTPATIENT)
Dept: FAMILY MEDICINE | Facility: CLINIC | Age: 38
End: 2023-01-09
Payer: COMMERCIAL

## 2023-01-09 VITALS
SYSTOLIC BLOOD PRESSURE: 120 MMHG | DIASTOLIC BLOOD PRESSURE: 80 MMHG | OXYGEN SATURATION: 99 % | HEART RATE: 105 BPM | RESPIRATION RATE: 16 BRPM | HEIGHT: 72 IN | BODY MASS INDEX: 42.66 KG/M2 | WEIGHT: 315 LBS | TEMPERATURE: 97.5 F

## 2023-01-09 DIAGNOSIS — F41.1 GENERALIZED ANXIETY DISORDER: ICD-10-CM

## 2023-01-09 DIAGNOSIS — Z11.59 NEED FOR HEPATITIS C SCREENING TEST: ICD-10-CM

## 2023-01-09 DIAGNOSIS — Z13.1 SCREENING FOR DIABETES MELLITUS: ICD-10-CM

## 2023-01-09 DIAGNOSIS — Z00.00 ROUTINE GENERAL MEDICAL EXAMINATION AT A HEALTH CARE FACILITY: Primary | ICD-10-CM

## 2023-01-09 DIAGNOSIS — F33.1 MAJOR DEPRESSIVE DISORDER, RECURRENT EPISODE, MODERATE (H): ICD-10-CM

## 2023-01-09 DIAGNOSIS — Z11.4 SCREENING FOR HIV (HUMAN IMMUNODEFICIENCY VIRUS): ICD-10-CM

## 2023-01-09 DIAGNOSIS — Z13.220 SCREENING FOR HYPERLIPIDEMIA: ICD-10-CM

## 2023-01-09 DIAGNOSIS — E66.01 MORBID OBESITY (H): ICD-10-CM

## 2023-01-09 DIAGNOSIS — R06.83 SNORING: ICD-10-CM

## 2023-01-09 LAB
ALBUMIN SERPL BCG-MCNC: 4.7 G/DL (ref 3.5–5.2)
ALP SERPL-CCNC: 64 U/L (ref 40–129)
ALT SERPL W P-5'-P-CCNC: 54 U/L (ref 10–50)
ANION GAP SERPL CALCULATED.3IONS-SCNC: 13 MMOL/L (ref 7–15)
AST SERPL W P-5'-P-CCNC: 33 U/L (ref 10–50)
BILIRUB SERPL-MCNC: 0.6 MG/DL
BUN SERPL-MCNC: 10.7 MG/DL (ref 6–20)
CALCIUM SERPL-MCNC: 9.5 MG/DL (ref 8.6–10)
CHLORIDE SERPL-SCNC: 105 MMOL/L (ref 98–107)
CHOLEST SERPL-MCNC: 233 MG/DL
CREAT SERPL-MCNC: 0.87 MG/DL (ref 0.67–1.17)
DEPRECATED HCO3 PLAS-SCNC: 25 MMOL/L (ref 22–29)
GFR SERPL CREATININE-BSD FRML MDRD: >90 ML/MIN/1.73M2
GLUCOSE SERPL-MCNC: 95 MG/DL (ref 70–99)
HDLC SERPL-MCNC: 37 MG/DL
LDLC SERPL CALC-MCNC: 154 MG/DL
NONHDLC SERPL-MCNC: 196 MG/DL
POTASSIUM SERPL-SCNC: 4.3 MMOL/L (ref 3.4–5.3)
PROT SERPL-MCNC: 7.4 G/DL (ref 6.4–8.3)
SODIUM SERPL-SCNC: 143 MMOL/L (ref 136–145)
TRIGL SERPL-MCNC: 208 MG/DL

## 2023-01-09 PROCEDURE — 99214 OFFICE O/P EST MOD 30 MIN: CPT | Mod: 25 | Performed by: FAMILY MEDICINE

## 2023-01-09 PROCEDURE — 80061 LIPID PANEL: CPT | Performed by: FAMILY MEDICINE

## 2023-01-09 PROCEDURE — 90471 IMMUNIZATION ADMIN: CPT | Performed by: FAMILY MEDICINE

## 2023-01-09 PROCEDURE — 99395 PREV VISIT EST AGE 18-39: CPT | Mod: 25 | Performed by: FAMILY MEDICINE

## 2023-01-09 PROCEDURE — 80053 COMPREHEN METABOLIC PANEL: CPT | Performed by: FAMILY MEDICINE

## 2023-01-09 PROCEDURE — 86803 HEPATITIS C AB TEST: CPT | Performed by: FAMILY MEDICINE

## 2023-01-09 PROCEDURE — 36415 COLL VENOUS BLD VENIPUNCTURE: CPT | Performed by: FAMILY MEDICINE

## 2023-01-09 PROCEDURE — 87389 HIV-1 AG W/HIV-1&-2 AB AG IA: CPT | Performed by: FAMILY MEDICINE

## 2023-01-09 PROCEDURE — 90686 IIV4 VACC NO PRSV 0.5 ML IM: CPT | Performed by: FAMILY MEDICINE

## 2023-01-09 RX ORDER — CITALOPRAM HYDROBROMIDE 40 MG/1
40 TABLET ORAL DAILY
Qty: 90 TABLET | Refills: 3 | Status: SHIPPED | OUTPATIENT
Start: 2023-01-09 | End: 2024-02-01

## 2023-01-09 ASSESSMENT — ENCOUNTER SYMPTOMS
HEMATURIA: 0
NAUSEA: 0
SHORTNESS OF BREATH: 1
HEARTBURN: 0
NERVOUS/ANXIOUS: 0
CHILLS: 0
SORE THROAT: 0
HEMATOCHEZIA: 0
ABDOMINAL PAIN: 0
MYALGIAS: 0
DIZZINESS: 0
FREQUENCY: 0
WEAKNESS: 0
FEVER: 0
HEADACHES: 1
COUGH: 0
PALPITATIONS: 0
EYE PAIN: 0
JOINT SWELLING: 0
CONSTIPATION: 0
PARESTHESIAS: 0
DYSURIA: 0
ARTHRALGIAS: 0
DIARRHEA: 0

## 2023-01-09 ASSESSMENT — ANXIETY QUESTIONNAIRES
3. WORRYING TOO MUCH ABOUT DIFFERENT THINGS: NOT AT ALL
GAD7 TOTAL SCORE: 1
1. FEELING NERVOUS, ANXIOUS, OR ON EDGE: NOT AT ALL
2. NOT BEING ABLE TO STOP OR CONTROL WORRYING: NOT AT ALL
7. FEELING AFRAID AS IF SOMETHING AWFUL MIGHT HAPPEN: NOT AT ALL
GAD7 TOTAL SCORE: 1
IF YOU CHECKED OFF ANY PROBLEMS ON THIS QUESTIONNAIRE, HOW DIFFICULT HAVE THESE PROBLEMS MADE IT FOR YOU TO DO YOUR WORK, TAKE CARE OF THINGS AT HOME, OR GET ALONG WITH OTHER PEOPLE: SOMEWHAT DIFFICULT
5. BEING SO RESTLESS THAT IT IS HARD TO SIT STILL: NOT AT ALL
6. BECOMING EASILY ANNOYED OR IRRITABLE: SEVERAL DAYS

## 2023-01-09 ASSESSMENT — PATIENT HEALTH QUESTIONNAIRE - PHQ9
SUM OF ALL RESPONSES TO PHQ QUESTIONS 1-9: 5
10. IF YOU CHECKED OFF ANY PROBLEMS, HOW DIFFICULT HAVE THESE PROBLEMS MADE IT FOR YOU TO DO YOUR WORK, TAKE CARE OF THINGS AT HOME, OR GET ALONG WITH OTHER PEOPLE: SOMEWHAT DIFFICULT
5. POOR APPETITE OR OVEREATING: NOT AT ALL
SUM OF ALL RESPONSES TO PHQ QUESTIONS 1-9: 5

## 2023-01-09 ASSESSMENT — PAIN SCALES - GENERAL: PAINLEVEL: NO PAIN (0)

## 2023-01-09 NOTE — LETTER
January 10, 2023      Jose Cabello  [address] [place] [address]        Dear ,    We are writing to inform you of your test results.    Your bad cholesterol (LDL) is high and puts you at higher risk for stroke and heart attacks. Active weight loss if you are overweight, regular cardio type exercise, cutting down on sugar, fat, carbohydrates in diet and adding more fruits and vegetable would improve your cholesterol. Please quit smoking if you are a smoker.     Your good cholesterol (HDL) is low, which improves with exercise.   The triglycerides are high. Lowering  the amount of sugar, alcohol and sweets in the diet helps to control this. Exercise and weight loss helps. Consider using over the counter fish oil (omega 3 fatty acid) to improve your triglycerides.   Your kidney function and diabetes test are fine.  One of the liver enzyme is borderline high which can be from weight.  Active weight loss is very necessary to avoid further damage to liver.  HIV screen test and hepatitis C screening test are within normal limit.       Ludin Chicas MD   [facility]       Resulted Orders   HIV Antigen Antibody Combo   Result Value Ref Range    HIV Antigen Antibody Combo Nonreactive Nonreactive      Comment:      HIV-1 p24 Ag & HIV-1/HIV-2 Ab Not Detected   Hepatitis C Screen Reflex to HCV RNA Quant and Genotype   Result Value Ref Range    Hepatitis C Antibody Nonreactive Nonreactive    Narrative    Assay performance characteristics have not been established for newborns, infants, and children.   Lipid panel reflex to direct LDL Fasting   Result Value Ref Range    Cholesterol 233 (H) <200 mg/dL    Triglycerides 208 (H) <150 mg/dL    Direct Measure HDL 37 (L) >=40 mg/dL    LDL Cholesterol Calculated 154 (H) <=100 mg/dL    Non HDL Cholesterol 196 (H) <130 mg/dL    Narrative    Cholesterol  Desirable:  <200 mg/dL    Triglycerides  Normal:  Less than 150 mg/dL  Borderline High:   150-199 mg/dL  High:  200-499 mg/dL  Very High:  Greater than or equal to 500 mg/dL    Direct Measure HDL  Female:  Greater than or equal to 50 mg/dL   Male:  Greater than or equal to 40 mg/dL    LDL Cholesterol  Desirable:  <100mg/dL  Above Desirable:  100-129 mg/dL   Borderline High:  130-159 mg/dL   High:  160-189 mg/dL   Very High:  >= 190 mg/dL    Non HDL Cholesterol  Desirable:  130 mg/dL  Above Desirable:  130-159 mg/dL  Borderline High:  160-189 mg/dL  High:  190-219 mg/dL  Very High:  Greater than or equal to 220 mg/dL   Comprehensive metabolic panel (BMP + Alb, Alk Phos, ALT, AST, Total. Bili, TP)   Result Value Ref Range    Sodium 143 136 - 145 mmol/L    Potassium 4.3 3.4 - 5.3 mmol/L    Chloride 105 98 - 107 mmol/L    Carbon Dioxide (CO2) 25 22 - 29 mmol/L    Anion Gap 13 7 - 15 mmol/L    Urea Nitrogen 10.7 6.0 - 20.0 mg/dL    Creatinine 0.87 0.67 - 1.17 mg/dL    Calcium 9.5 8.6 - 10.0 mg/dL    Glucose 95 70 - 99 mg/dL    Alkaline Phosphatase 64 40 - 129 U/L    AST 33 10 - 50 U/L    ALT 54 (H) 10 - 50 U/L    Protein Total 7.4 6.4 - 8.3 g/dL    Albumin 4.7 3.5 - 5.2 g/dL    Bilirubin Total 0.6 <=1.2 mg/dL    GFR Estimate >90 >60 mL/min/1.73m2      Comment:      Effective December 21, 2021 eGFRcr in adults is calculated using the 2021 CKD-EPI creatinine equation which includes age and gender (Miky et al., NEJM, DOI: 10.1056/CBLZcg8462107)       If you have any questions or concerns, please call the clinic at the number listed above.       Sincerely,      LUIZ/ Ludin Chicas MD

## 2023-01-09 NOTE — PROGRESS NOTES
SUBJECTIVE:   CC: Jose is an 37 year old who presents for preventative health visit.   Patient has been advised of split billing requirements and indicates understanding: Yes  Healthy Habits:     Getting at least 3 servings of Calcium per day:  NO    Bi-annual eye exam:  Yes    Dental care twice a year:  NO    Sleep apnea or symptoms of sleep apnea:  Daytime drowsiness and Excessive snoring    Diet:  Regular (no restrictions)    Frequency of exercise:  1 day/week    Duration of exercise:  Less than 15 minutes    Taking medications regularly:  Yes    Barriers to taking medications:  None    Medication side effects:  Not applicable    PHQ-2 Total Score: 2    Additional concerns today:  No  Answers for HPI/ROS submitted by the patient on 2023  If you checked off any problems, how difficult have these problems made it for you to do your work, take care of things at home, or get along with other people?: Somewhat difficult  PHQ9 TOTAL SCORE: 5    Check ear     Depression and Anxiety Follow-Up    How are you doing with your depression since your last visit? Improved -mood swings     How are you doing with your anxiety since your last visit?  Improved      Are you having other symptoms that might be associated with depression or anxiety? No    Have you had a significant life event? No     Do you have any concerns with your use of alcohol or other drugs? No    Social History     Tobacco Use     Smoking status: Former     Types: Cigarettes     Quit date: 2010     Years since quittin.0     Smokeless tobacco: Never   Substance Use Topics     Alcohol use: No     Drug use: No     PHQ 2020   PHQ-9 Total Score 3 12 5   Q9: Thoughts of better off dead/self-harm past 2 weeks Not at all Not at all Not at all     MANUEL-7 SCORE 2020   Total Score - - -   Total Score 1 14 1     Last PHQ-9 2023   1.  Little interest or pleasure in doing things 1   2.  Feeling down,  depressed, or hopeless 1   3.  Trouble falling or staying asleep, or sleeping too much 1   4.  Feeling tired or having little energy 1   5.  Poor appetite or overeating 1   6.  Feeling bad about yourself 0   7.  Trouble concentrating 0   8.  Moving slowly or restless 0   Q9: Thoughts of better off dead/self-harm past 2 weeks 0   PHQ-9 Total Score 5   Difficulty at work, home, or with people -     MANUEL-7  2023   1. Feeling nervous, anxious, or on edge 0   2. Not being able to stop or control worrying 0   3. Worrying too much about different things 0   4. Trouble relaxing 0   5. Being so restless that it is hard to sit still 0   6. Becoming easily annoyed or irritable 1   7. Feeling afraid, as if something awful might happen 0   MANUEL-7 Total Score 1   If you checked any problems, how difficult have they made it for you to do your work, take care of things at home, or get along with other people? Somewhat difficult       Suicide Assessment Five-step Evaluation and Treatment (SAFE-T)                Today's PHQ-2 Score:   PHQ-2 (  [employer]) 2023   Q1: Little interest or pleasure in doing things 1   Q2: Feeling down, depressed or hopeless 1   PHQ-2 Score 2   PHQ-2 Total Score (12-17 Years)- Positive if 3 or more points; Administer PHQ-A if positive -   Q1: Little interest or pleasure in doing things Several days   Q2: Feeling down, depressed or hopeless Several days   PHQ-2 Score 2     Social History     Tobacco Use     Smoking status: Former     Types: Cigarettes     Quit date: 2010     Years since quittin.0     Smokeless tobacco: Never   Substance Use Topics     Alcohol use: No     If you drink alcohol do you typically have >3 drinks per day or >7 drinks per week? Not applicable    Alcohol Use 2023   Prescreen: >3 drinks/day or >7 drinks/week? Not Applicable   Prescreen: >3 drinks/day or >7 drinks/week? -       Last PSA: No results found for: PSA    Reviewed orders with patient. Reviewed health  "maintenance and updated orders accordingly - Yes      Reviewed and updated as needed this visit by clinical staff   Tobacco  Allergies  Meds              Reviewed and updated as needed this visit by Provider    Weight is stable. Interested in seeing weight loss specialist.   Working on exercise and planning to work with .    celexa - doing well. Depression less. Anxiety less than before. Some mood swings present (some days better mood than others). PCA of his mother and it is stressful. No manic or hypomanic episodes.     Snores. Mother, uncle, aunt - all with sleep apnea. Feels tired during day time. Usually takes a nap.     No smoking or drinking. Can have more fruits and veggies.     Seeing a therapist - Raghavendra Zaidi at [facility].     Review of Systems   Constitutional: Negative for chills and fever.   HENT: Negative for congestion, ear pain, hearing loss and sore throat.    Eyes: Negative for pain and visual disturbance.   Respiratory: Positive for shortness of breath. Negative for cough.    Cardiovascular: Negative for chest pain, palpitations and peripheral edema.   Gastrointestinal: Negative for abdominal pain, constipation, diarrhea, heartburn, hematochezia and nausea.   Genitourinary: Negative for dysuria, frequency, genital sores, hematuria, impotence, penile discharge and urgency.   Musculoskeletal: Negative for arthralgias, joint swelling and myalgias.   Skin: Negative for rash.   Neurological: Positive for headaches. Negative for dizziness, weakness and paresthesias.   Psychiatric/Behavioral: Positive for mood changes. The patient is not nervous/anxious.      OBJECTIVE:   /80 (BP Location: Right arm, Patient Position: Chair, Cuff Size: Adult Large)   Pulse 105   Temp 97.5  F (36.4  C) (Temporal)   Resp 16   Ht 1.835 m (6' 0.25\")   Wt (!) 161.5 kg (356 lb)   SpO2 99%   BMI 47.95 kg/m      Physical Exam  GENERAL: healthy, alert and no distress  EYES: Eyes grossly normal " to inspection, PERRL and conjunctivae and sclerae normal  HENT: ear canals and TM's normal, nose and mouth without ulcers or lesions  NECK: no adenopathy, no asymmetry, masses, or scars and thyroid normal to palpation  RESP: lungs clear to auscultation - no rales, rhonchi or wheezes  CV: regular rate and rhythm, normal S1 S2, no S3 or S4, no murmur, click or rub, no peripheral edema and peripheral pulses strong  ABDOMEN: soft, nontender, no hepatosplenomegaly, no masses and bowel sounds normal  MS: no gross musculoskeletal defects noted, no edema  SKIN: no suspicious lesions or rashes  NEURO: Normal strength and tone, mentation intact and speech normal  PSYCH: mentation appears normal, affect normal/bright        ASSESSMENT/PLAN:   (Z00.00) Routine general medical examination at a health care facility  (primary encounter diagnosis)  Comment:    Plan:      (E66.01) Morbid obesity (H)  Comment:  Body mass index is 47.95 kg/m .  Going to work on's lifestyle, diet and exercise.  Discussed weight loss clinic and he agreed.  Interested in medical weight loss clinic.  Plan: Comprehensive Weight Management, Comprehensive         metabolic panel (BMP + Alb, Alk Phos, ALT, AST,        Total. Bili, TP)             (R06.83) Snoring  Comment:  With high risk of sleep apnea with body habitus and family history.  Plan: Adult Sleep Eval & Management          Referral             (F33.1) Major depressive disorder, recurrent episode, moderate (H)  Comment:  Patient is tolerating current medication without any major side effects of concerns and current dose seems reasonable too.  Current medication regime is effective. Continue current treatment without any changes.   Plan: citalopram (CELEXA) 40 MG tablet             (F41.1) Generalized anxiety disorder  Comment:    Payer/Plan Subscr  Sex Relation Sub. Ins. ID Effective Group Num   1. [employer] - [employer]* PINO GARCIA 1985 Male Self [id number]                                    [address]     Plan: citalopram (CELEXA) 40 MG tablet             (Z11.4) Screening for HIV (human immunodeficiency virus)  Comment:    Plan: HIV Antigen Antibody Combo             (Z11.59) Need for hepatitis C screening test  Comment:    Plan: Hepatitis C Screen Reflex to HCV RNA Quant and         Genotype             (Z13.220) Screening for hyperlipidemia  Comment:    Plan: Lipid panel reflex to direct LDL Fasting             (Z13.1) Screening for diabetes mellitus  Comment:    Plan: Comprehensive metabolic panel (BMP + Alb, Alk         Phos, ALT, AST, Total. Bili, TP)                   COUNSELING:   Reviewed preventive health counseling, as reflected in patient instructions    He reports that he quit smoking about 12 years ago. His smoking use included cigarettes. He has never used smokeless tobacco.      Ludin Chicas MD, MD  [facility]

## 2023-01-10 LAB
HCV AB SERPL QL IA: NONREACTIVE
HIV 1+2 AB+HIV1 P24 AG SERPL QL IA: NONREACTIVE

## 2023-04-18 ENCOUNTER — VIRTUAL VISIT (OUTPATIENT)
Dept: SLEEP MEDICINE | Facility: CLINIC | Age: 38
End: 2023-04-18
Attending: FAMILY MEDICINE
Payer: COMMERCIAL

## 2023-04-18 VITALS — BODY MASS INDEX: 42.66 KG/M2 | WEIGHT: 315 LBS | HEIGHT: 72 IN

## 2023-04-18 DIAGNOSIS — R06.83 SNORING: ICD-10-CM

## 2023-04-18 DIAGNOSIS — G47.33 OSA (OBSTRUCTIVE SLEEP APNEA): Primary | ICD-10-CM

## 2023-04-18 PROCEDURE — 99203 OFFICE O/P NEW LOW 30 MIN: CPT | Mod: VID | Performed by: INTERNAL MEDICINE

## 2023-04-18 ASSESSMENT — SLEEP AND FATIGUE QUESTIONNAIRES
HOW LIKELY ARE YOU TO NOD OFF OR FALL ASLEEP WHILE SITTING AND TALKING TO SOMEONE: WOULD NEVER DOZE
HOW LIKELY ARE YOU TO NOD OFF OR FALL ASLEEP WHILE LYING DOWN TO REST IN THE AFTERNOON WHEN CIRCUMSTANCES PERMIT: HIGH CHANCE OF DOZING
HOW LIKELY ARE YOU TO NOD OFF OR FALL ASLEEP IN A CAR, WHILE STOPPED FOR A FEW MINUTES IN TRAFFIC: WOULD NEVER DOZE
HOW LIKELY ARE YOU TO NOD OFF OR FALL ASLEEP WHILE SITTING QUIETLY AFTER LUNCH WITHOUT ALCOHOL: WOULD NEVER DOZE
HOW LIKELY ARE YOU TO NOD OFF OR FALL ASLEEP WHEN YOU ARE A PASSENGER IN A CAR FOR AN HOUR WITHOUT A BREAK: SLIGHT CHANCE OF DOZING
HOW LIKELY ARE YOU TO NOD OFF OR FALL ASLEEP WHILE WATCHING TV: HIGH CHANCE OF DOZING
HOW LIKELY ARE YOU TO NOD OFF OR FALL ASLEEP WHILE SITTING INACTIVE IN A PUBLIC PLACE: WOULD NEVER DOZE
HOW LIKELY ARE YOU TO NOD OFF OR FALL ASLEEP WHILE SITTING AND READING: SLIGHT CHANCE OF DOZING

## 2023-04-18 ASSESSMENT — PAIN SCALES - GENERAL: PAINLEVEL: NO PAIN (0)

## 2023-04-18 NOTE — PROGRESS NOTES
Name: Jose Cabello MRN# [id number]   Age: 37 year old YOB: 1985     Date of Consultation: April 18, 2023  Consultation is requested by: Ludin Chicas MD  [address] [place] [address] Ludin Chicas  Primary care provider: Ludin Chicas       Reason for Sleep Consult:     Patient s Reason for visit  Jose Cabello main reason for visit: Improving my sleep quality. Probably need a CPAP machine.  Patient states problem(s) started: Past few years it has become more noticeable.  Jose Cabello's goals for this visit: To figure out a way to improve the quality of my sleep.               Assessment and Plan:     Summary Sleep Diagnoses:    Clinical signs and symptoms of obstructive sleep apnea      Comorbid Diagnoses:    Generalized anxiety disorder    Severe obesity BMI 47    Depression    Summary Recommendations(things to be done):    Split-night polysomnographic study monitoring and CPAP initiation    Summary Counseling (items discussed):    We discussed potential complications of untreated disease and treatment options including device therapies, surgical therapies and medications.    Medical Decision-making: Pretest probability of obstructive sleep apnea exceeds 70% with perhaps 25% risk of accompanying hypoventilation in the setting of BMI of 47 and unexplained headaches.         HISTORY PRESENT ILLNESS:     Jose Cabello is a 37 year old year old with severe obesity (BMI 48) with loud habitual snoring, nonrestorative sleep and sleep disruption worsening with gradual weight gain and associated with morning or early afternoon headaches.  He denies dyspnea on exertion, peripheral edema and has not had previously diagnosed hypertension.           SLEEP-WAKE SCHEDULE:      Work/School Days: Patient goes to school/work: No   Usually gets into bed at Varies. 1 AM but oftentimes earlier.  Takes patient about On a good night, 5 minutes or  less. to fall asleep  Has trouble falling asleep Once every two weeks or less. Usually nights before I know I have something important to do. nights per week  Wakes up in the middle of the night Usually just once to go to bathroom. times.  Wakes up due to Snorting self awake, Use the bathroom, Anxiety  He has trouble falling back asleep Not often times a week.   It usually takes 10 minutes or more to get back to sleep  Patient is usually up at Between 9 and 10 AM  Uses alarm: No    Weekends/Non-work Days/All Other Days:  Usually gets into bed at 2-3 AM   Takes patient about 5 minutes to fall asleep  Patient is usually up at 11-12 AM/PM  Uses alarm: No    Sleep Need  Patient gets  6-7 Hrs sleep on average   Patient thinks he needs about 8 Hrs sleep    Jose Cabello prefers to sleep in this position(s): Back, Side   Patient states they do the following activities in bed: Read, Watch TV, Use phone, computer, or tablet    Naps  Patient takes a purposeful nap Varies. Sometimes 3 days a week. Maybe more, maybe less. times a week and naps are usually 1-1.5 Hrs in duration  He feels better after a nap: Yes  He dozes off unintentionally Varies. 2-3 times a week. days per week  Patient has had a driving accident or near-miss due to sleepiness/drowsiness: No      SLEEP DISRUPTIONS:      Breathing/Snoring    Snoring:Yes  Other people complain about his snoring: Yes  Others observehe stops breathing in his sleep: Yes  He has issues with the following: Morning headaches, Stuffy nose when you wake up      Movement:    Pain, discomfort, with an urge to move:  No  Happens when he is resting:  No  Happens more at night:  No  Patient has been told he kicks his legs at night:  No       Behaviors in Wakefulness/Sleep:    Dream enactment   No  Sleep eating   No  Bruxism  No                    Jose Cabello has experienced the following behaviors while sleeping: Sleep-talking, Sleepwalking  He has experienced sudden muscle weakness  during the day: No      Is there anything else you would like your sleep provider to know:          CAFFEINE AND OTHER SUBSTANCES:    Patient consumes caffeinated beverages per day:  Varies as to the amount of pop daily.  Last caffeine use is usually: 11 PM or earlier  List of any prescribed or over the counter stimulants that patient takes:    List of any prescribed or over the counter sleep medication patient takes:    List of previous sleep medications that patient has tried: Forget name of. Prescribed over a decade ago as a temporary thing with anti-depressants.  Patient drinks alcohol to help them sleep: No  Patient drinks alcohol near bedtime: No    Family History:  Patient has a family member been diagnosed with a sleep disorder: Yes  My mom, my aunts, my uncles              SCALES:       EPWORTH SLEEPINESS SCALE         4/18/2023     2:07 PM    Copeland Sleepiness Scale ( CARSON Russell  [id number]<br>ESS - [place]/English - Final version - 21 Nov 07 - [facility].)   Sitting and reading Slight chance of dozing   Watching TV High chance of dozing   Sitting, inactive in a public place (e.g. a theatre or a meeting) Would never doze   As a passenger in a car for an hour without a break Slight chance of dozing   Lying down to rest in the afternoon when circumstances permit High chance of dozing   Sitting and talking to someone Would never doze   Sitting quietly after a lunch without alcohol Would never doze   In a car, while stopped for a few minutes in traffic Would never doze   Copeland Score (MC) 8   Copeland Score (Sleep) 8         INSOMNIA SEVERITY INDEX (CIARAN)          4/18/2023     1:50 PM   Insomnia Severity Index (CIARAN)   Difficulty falling asleep 1   Difficulty staying asleep 0   Problems waking up too early 0   How SATISFIED/DISSATISFIED are you with your CURRENT sleep pattern? 2   How NOTICEABLE to others do you think your sleep problem is in terms of impairing the quality of your life? 4   How  "WORRIED/DISTRESSED are you about your current sleep problem? 3   To what extent do you consider your sleep problem to INTERFERE with your daily functioning (e.g. daytime fatigue, mood, ability to function at work/daily chores, concentration, memory, mood, etc.) CURRENTLY? 3   CIARAN Total Score 13       Guidelines for Scoring/Interpretation:  Total score categories:  0-7 = No clinically significant insomnia   8-14 = Subthreshold insomnia   15-21 = Clinical insomnia (moderate severity)  22-28 = Clinical insomnia (severe)  Used via courtesy of www.StitcherAdsth.va.gov with permission from Rachid Becerra PhD., [facility]      STOP BANG         4/18/2023     2:11 PM   STOP BANG Questionnaire (  2008, the American Society of Anesthesiologists, Inc. [employer]   BMI Clinic: 47.47         GAD7        1/9/2023     3:37 PM   MANUEL-7    1. Feeling nervous, anxious, or on edge 0   2. Not being able to stop or control worrying 0   3. Worrying too much about different things 0   4. Trouble relaxing 0   5. Being so restless that it is hard to sit still 0   6. Becoming easily annoyed or irritable 1   7. Feeling afraid, as if something awful might happen 0   MAUNEL-7 Total Score 1   If you checked any problems, how difficult have they made it for you to do your work, take care of things at home, or get along with other people? Somewhat difficult         CAGE-AID         View : No data to display.                CAGE-AID reprinted with permission from the [place] Medical Journal, SUZY Earl. and MANUEL See, \"Conjoint screening questionnaires for alcohol and drug abuse\" [place] Medical Journal 94: 135-140, 1995.      PATIENT HEALTH QUESTIONNAIRE-9 (PHQ - 9)        1/9/2023     3:28 PM   PHQ-9 ([employer])   1.  Little interest or pleasure in doing things 1   2.  Feeling down, depressed, or hopeless 1   3.  Trouble falling or staying asleep, or sleeping too much 1   4.  Feeling tired or having little energy 1   5. "  Poor appetite or overeating 1   6.  Feeling bad about yourself - or that you are a failure or have let yourself or your family down 0   7.  Trouble concentrating on things, such as reading the newspaper or watching television 0   8.  Moving or speaking so slowly that other people could have noticed. Or the opposite - being so fidgety or restless that you have been moving around a lot more than usual 0   9.  Thoughts that you would be better off dead, or of hurting yourself in some way 0   PHQ-9 Total Score 5   6.  Feeling bad about yourself 0   7.  Trouble concentrating 0   8.  Moving slowly or restless 0   9.  Suicidal or self-harm thoughts 0   1.  Little interest or pleasure in doing things Several days   2.  Feeling down, depressed, or hopeless Several days   3.  Trouble falling or staying asleep, or sleeping too much Several days   4.  Feeling tired or having little energy Several days   5.  Poor appetite or overeating Several days   6.  Feeling bad about yourself Not at all   7.  Trouble concentrating Not at all   8.  Moving slowly or restless Not at all   9.  Suicidal or self-harm thoughts Not at all   PHQ-9 via [facility] TOTAL SCORE-----> 5 (Mild depression)   Difficulty at work, home, or with people Somewhat difficult       Developed by Caden Pereira, Lety Solares, Kristofer Claire and colleagues, with an educational constantine from [employer] No permission required to reproduce, translate, display or distribute.        Allergies:    Allergies   Allergen Reactions     Effexor [Venlafaxine]             Problem List:     Patient Active Problem List   Diagnosis     CARDIOVASCULAR SCREENING; LDL GOAL LESS THAN 160     Moderate major depression (H)     Generalized anxiety disorder     Morbid obesity (H)            MEDICATIONS:     Current Outpatient Medications   Medication Sig Dispense Refill     acetaminophen 500 MG CAPS 1-2 tablets 3 times per day as needed for pain 60 capsule 1     citalopram (CELEXA)  40 MG tablet Take 1 tablet (40 mg) by mouth daily 90 tablet 3       Problem List:  Patient Active Problem List    Diagnosis Date Noted     Moderate major depression (H) 2012     Priority: Medium     Seeing Isaías Yanez psychiatrist at [facility] [phone]       Generalized anxiety disorder 2012     Priority: Medium     Morbid obesity (H) 2012     Priority: Medium     CARDIOVASCULAR SCREENING; LDL GOAL LESS THAN 160 2010     Priority: Medium        Past Medical/Surgical History:  Past Medical History:   Diagnosis Date     Anxiety      Depression      Headaches      No past surgical history on file.    Social History:  Social History     Socioeconomic History     Marital status: Single     Spouse name: Not on file     Number of children: Not on file     Years of education: Not on file     Highest education level: Not on file   Occupational History     Not on file   Tobacco Use     Smoking status: Former     Types: Cigarettes     Quit date: 2010     Years since quittin.3     Smokeless tobacco: Never   Vaping Use     Vaping status: Not on file   Substance and Sexual Activity     Alcohol use: No     Drug use: No     Sexual activity: Yes     Partners: Female   Other Topics Concern     Parent/sibling w/ CABG, MI or angioplasty before 65F 55M? No   Social History Narrative    Balanced Diet - Yes    Osteoporosis Preventative measures-  Dairy servings per day: 2-3    Regular Exercise -  No     Dental Exam up - NO    Eye Exam - NO    Self Testicular Exam -  Yes    Do you have any concerns about STD's -  No    Abuse: Current or Past (Physical, Sexual or Emotional)- No    Do you feel safe in your environment - Yes    Guns stored in the home - No    Sunscreen used - Yes    Seatbelts used - Yes    Lipids - UNKNOWN    Glucose -  UNKNOWN    Colon Cancer Screening -N/A    Hemoccults - NO    PSA - N/A    Digital Rectal Exam - N/A    Immunizations reviewed and up to date -unknown         Social  Determinants of Health     Financial Resource Strain: High Risk (11/9/2021)    Overall Financial Resource Strain (CARDIA)      Difficulty of Paying Living Expenses: Very hard   Food Insecurity: Food Insecurity Present (11/9/2021)    Hunger Vital Sign      Worried About Running Out of Food in the Last Year: Sometimes true      Ran Out of Food in the Last Year: Never true   Transportation Needs: No Transportation Needs (11/9/2021)    PRAPARE - Transportation      Lack of Transportation (Medical): No      Lack of Transportation (Non-Medical): No   Physical Activity: Insufficiently Active (11/9/2021)    Exercise Vital Sign      Days of Exercise per Week: 1 day      Minutes of Exercise per Session: 10 min   Stress: Stress Concern Present (11/9/2021)    [place] [employer] - Occupational Stress Questionnaire      Feeling of Stress : Rather much   Social Connections: Socially Isolated (11/9/2021)    Social Connection and Isolation Panel [NHANES]      Frequency of Communication with Friends and Family: Once a week      Frequency of Social Gatherings with Friends and Family: Never      Attends Hoahaoism Services: Never      Active Member of Clubs or Organizations: No      Attends Club or Organization Meetings: Not on file      Marital Status: Never    Intimate Partner Violence: Not on file   Housing Stability: Unknown (11/9/2021)    Housing Stability Vital Sign      Unable to Pay for Housing in the Last Year: Patient refused      Number of Places Lived in the Last Year: 1      Unstable Housing in the Last Year: Patient refused       Family History:  Family History   Problem Relation Age of Onset     Diabetes Other         Aunt       Review of Systems:  A complete review of systems reviewed by me is negative with the exeption of what has been mentioned in the history of present illness.  In the last TWO WEEKS have you experienced any of the following symptoms?  Night Sweats: Yes  Weight Gain:  Yes  Pain at Night: No  Double Vision: No  Changes in Vision: No  Difficulty Breathing through Nose: Yes  Sore Throat in Morning: No  Dry Mouth in the Morning: No  Shortness of Breath Lying Flat: No  Shortness of Breath With Activity: Yes  Awakening with Shortness of Breath: No  Increased Cough: No  Heart Racing at Night: No  Swelling in Feet or Legs: No  Diarrhea at Night: No  Heartburn at Night: No  Urinating More than Once at Night: Yes  Losing Control of Urine at Night: No  Joint Pains at Night: No  Headaches in Morning: Yes  Weakness in Arms or Legs: Yes  Depressed Mood: Yes  Anxiety: Yes          Physical Examination:     Vitals: Ht 1.829 m (6')   Wt (!) 158.8 kg (350 lb)   BMI 47.47 kg/m    BMI= Body mass index is 47.47 kg/m .    GENERAL: Healthy, alert and no distress  EYES: Eyes grossly normal to inspection.  No discharge or erythema, or obvious scleral/conjunctival abnormalities.  RESP: No audible wheeze, cough, or visible cyanosis.  No visible retractions or increased work of breathing.    SKIN: Visible skin clear. No significant rash, abnormal pigmentation or lesions.  NEURO: Cranial nerves grossly intact.  Mentation and speech appropriate for age.  PSYCH: Mentation appears normal, affect normal/bright, judgement and insight intact, normal speech and appearance well-groomed.                  Data: All pertinent previous laboratory data reviewed     Recent Labs   Lab Test 01/09/23  1608 11/09/21  1429     --    POTASSIUM 4.3  --    CHLORIDE 105  --    CO2 25  --    ANIONGAP 13  --    GLC 95 87   BUN 10.7  --    CR 0.87  --    LASHA 9.5  --        No results for input(s): WBC, RBC, HGB, HCT, MCV, MCH, MCHC, RDW, PLT in the last 71710 hours.    Recent Labs   Lab Test 01/09/23  1608   PROTTOTAL 7.4   ALBUMIN 4.7   BILITOTAL 0.6   ALKPHOS 64   AST 33   ALT 54*         ROSALVA BROWN MD 4/18/2023     Total time spent reviewing medical records including previous testing and interpretation as well as direct  patient contact and documentation on this date: 60 minutes

## 2023-04-18 NOTE — PATIENT INSTRUCTIONS
"      MY TREATMENT INFORMATION FOR SLEEP APNEA-  oJse Cabello    DOCTOR : ROSALVA BROWN MD    Am I having a sleep study at a sleep center?  --->Due to normal delays, you will be contacted within 2-4 weeks to schedule  Frequently asked questions:  1. What is Obstructive Sleep Apnea (LOGAN)? LOGAN is the most common type of sleep apnea. Apnea means, \"without breath.\"  Apnea is most often caused by narrowing or collapse of the upper airway as muscles relax during sleep.   Almost everyone has occasional apneas. Most people with sleep apnea have had brief interruptions at night frequently for many years.  The severity of sleep apnea is related to how frequent and severe the events are.   2. What are the consequences of LOGAN? Symptoms include: feeling sleepy during the day, snoring loudly, gasping or stopping of breathing, trouble sleeping, and occasionally morning headaches or heartburn at night.  Sleepiness can be serious and even increase the risk of falling asleep while driving. Other health consequences may include development of high blood pressure and other cardiovascular disease in persons who are susceptible. Untreated LOGAN  can contribute to heart disease, stroke and diabetes.   3. What are the treatment options? In most situations, sleep apnea is a lifelong disease that must be managed with daily therapy. Medications are not effective for sleep apnea and surgery is generally not considered until other therapies have been tried. Your treatment is your choice . Continuous Positive Airway (CPAP) works right away and is the therapy that is effective in nearly everyone. An oral device to hold your jaw forward is usually the next most reliable option. Other options include postioning devices (to keep you off your back), weight loss, and surgery including a tongue pacing device. There is more detail about some of these options below.  4. Are my sleep studies covered by insurance? Although we will request verification of " coverage, we advise you also check in advance of the study to ensure there is coverage.    Important tips for those choosing CPAP and similar devices   Know your equipment:  CPAP is continuous positive airway pressure that prevents obstructive sleep apnea by keeping the throat from collapsing while you are sleeping. In most cases, the device is  smart  and can slowly self-adjusts if your throat collapses and keeps a record every day of how well you are treated-this information is available to you and your care team.  BPAP is bilevel positive airway pressure that keeps your throat open and also assists each breath with a pressure boost to maintain adequate breathing.  Special kinds of BPAP are used in patients who have inadequate breathing from lung or heart disease. In most cases, the device is  smart  and can slowly self-adjusts to assist breathing. Like CPAP, the device keeps a record of how well you are treated.  Your mask is your connection to the device. You get to choose what feels most comfortable and the staff will help to make sure if fits. Here: are some examples of the different masks that are available:       Key points to remember on your journey with sleep apnea:  Sleep study.  PAP devices often need to be adjusted during a sleep study to show that they are effective and adjusted right.  Good tips to remember: Try wearing just the mask during a quiet time during the day so your body adapts to wearing it. A humidifier is recommended for comfort in most cases to prevent drying of your nose and throat. Allergy medication from your provider may help you if you are having nasal congestion.  Getting settled-in. It takes more than one night for most of us to get used to wearing a mask. Try wearing just the mask during a quiet time during the day so your body adapts to wearing it. A humidifier is recommended for comfort in most cases. Our team will work with you carefully on the first day and will be in  contact within 4 days and again at 2 and 4 weeks for advice and remote device adjustments. Your therapy is evaluated by the device each day.   Use it every night. The more you are able to sleep naturally for 7-8 hours, the more likely you will have good sleep and to prevent health risks or symptoms from sleep apnea. Even if you use it 4 hours it helps. Occasionally all of us are unable to use a medical therapy, in sleep apnea, it is not dangerous to miss one night.   Communicate. Call our skilled team on the number provided on the first day if your visit for problems that make it difficult to wear the device. Over 2 out of 3 patients can learn to wear the device long-term with help from our team. Remember to call our team or your sleep providers if you are unable to wear the device as we may have other solutions for those who cannot adapt to mask CPAP therapy. It is recommended that you sleep your sleep provider within the first 3 months and yearly after that if you are not having problems.   Use it for your health. We encourage use of CPAP masks during daytime quiet periods to allow your face and brain to adapt to the sensation of CPAP so that it will be a more natural sensation to awaken to at night or during naps. This can be very useful during the first few weeks or months of adapting to CPAP though it does not help medically to wear CPAP during wakefulness and  should not be used as a strategy just to meet guidelines.  Take care of your equipment. Make sure you clean your mask and tubing using directions every day and that your filter and mask are replaced as recommended or if they are not working.     BESIDES CPAP, WHAT OTHER THERAPIES ARE THERE?    Positioning Device  Positioning devices are generally used when sleep apnea is mild and only occurs on your back.This example shows a pillow that straps around the waist. It may be appropriate for those whose sleep study shows milder sleep apnea that occurs  primarily when lying flat on one's back. Preliminary studies have shown benefit but effectiveness at home may need to be verified by a home sleep test. These devices are generally not covered by medical insurance.  Examples of devices that maintain sleeping on the back to prevent snoring and mild sleep apnea.    Belt type body positioner  http://[employer]/    Electronic reminder  http://nightshifttherapy.com/            Oral Appliance  What is oral appliance therapy?  An oral appliance device fits on your teeth at night like a retainer used after having braces. The device is made by a specialized dentist and requires several visits over 1-2 months before a manufactured device is made to fit your teeth and is adjusted to prevent your sleep apnea. Once an oral device is working properly, snoring should be improved. A home sleep test may be recommended at that time if to determine whether the sleep apnea is adequately treated.       Some things to remember:  -Oral devices are often, but not always, covered by your medical insurance. Be sure to check with your insurance provider.   -If you are referred for oral therapy, you will be given a list of specialized dentists to consider or you may choose to visit the Web site of the American Academy of Dental Sleep Medicine  -Oral devices are less likely to work if you have severe sleep apnea or are extremely overweight.     More detailed information  An oral appliance is a small acrylic device that fits over the upper and lower teeth  (similar to a retainer or a mouth guard). This device slightly moves jaw forward, which moves the base of the tongue forward, opens the airway, improves breathing for effective treat snoring and obstructive sleep apnea in perhaps 7 out of 10 people .  The best working devices are custom-made by a dental device  after a mold is made of the teeth 1, 2, 3.  When is an oral appliance indicated?  Oral appliance therapy is recommended  as a first-line treatment for patients with primary snoring, mild sleep apnea, and for patients with moderate sleep apnea who prefer appliance therapy to use of CPAP4, 5. Severity of sleep apnea is determined by sleep testing and is based on the number of respiratory events per hour of sleep.   How successful is oral appliance therapy?  The success rate of oral appliance therapy in patients with mild sleep apnea is 75-80% while in patients with moderate sleep apnea it is 50-70%. The chance of success in patients with severe sleep apnea is 40-50%. The research also shows that oral appliances have a beneficial effect on the cardiovascular health of LOGAN patients at the same magnitude as CPAP therapy7.  Oral appliances should be a second-line treatment in cases of severe sleep apnea, but if not completely successful then a combination therapy utilizing CPAP plus oral appliance therapy may be effective. Oral appliances tend to be effective in a broad range of patients although studies show that the patients who have the highest success are females, younger patients, those with milder disease, and less severe obesity. 3, 6.   Finding a dentist that practices dental sleep medicine  Specific training is available through the American Academy of Dental Sleep Medicine for dentists interested in working in the field of sleep. To find a dentist who is educated in the field of sleep and the use of oral appliances, near you, visit the Web site of the American Academy of Dental Sleep Medicine.    References  1. Pat et al. Objectively measured vs self-reported compliance during oral appliance therapy for sleep-disordered breathing. Chest 2013; 144(5): 9729-2037.  2. Diana et al. Objective measurement of compliance during oral appliance therapy for sleep-disordered breathing. Thorax 2013; 68(1): 91-96.  3. Dee et al. Mandibular advancement devices in 620 men and women with LOGAN and snoring: tolerability and  predictors of treatment success. Chest 2004; 125: 8682-1274.  4. Yuan et al. Oral appliances for snoring and LOGAN: a review. Sleep 2006; 29: 244-262.  5. Caleb et al. Oral appliance treatment for LOGAN: an update. J Clin Sleep Med 2014; 10(2): 215-227.  6. Amanda et al. Predictors of OSAH treatment outcome. J Dent Res 2007; 86: 1182-3835.      Weight Loss:    Weight loss is a long-term strategy that may improve sleep apnea in some patients.    Weight management is a personal decision and the decision should be based on your interest and the potential benefits.  If you are interested in exploring weight loss strategies, the following discussion covers the impact on weight loss on sleep apnea and the approaches that may be successful.    Being overweight does not necessarily mean you will have health consequences.  Those who have BMI over 35 or over 27 with existing medical conditions carries greater risk.   Weight loss decreases severity of sleep apnea in most people with obesity. For those with mild obesity who have developed snoring with weight gain, even 15-30 pound weight loss can improve and occasionally eliminate sleep apnea.  Structured and life-long dietary and health habits are necessary to lose weight and keep healthier weight levels.     Though there may be significant health benefits from weight loss, long-term weight loss is very difficult to achieve- studies show success with dietary management in less than 10% of people. In addition, substantial weight loss may require years of dietary control and may be difficult if patients have severe obesity. In these cases, surgical management may be considered.  Finally, older individuals who have tolerated obesity without health complications may be less likely to benefit from weight loss strategies.      [unfilled]    Surgery:    Surgery for obstructive sleep apnea is considered generally only when other therapies fail to work. Surgery may be  discussed with you if you are having a difficult time tolerating CPAP and or when there is an abnormal structure that requires surgical correction.  Nose and throat surgeries often enlarge the airway to prevent collapse.  Most of these surgeries create pain for 1-2 weeks and up to half of the most common surgeries are not effective throughout life.  You should carefully discuss the benefits and drawbacks to surgery with your sleep provider and surgeon to determine if it is the best solution for you.   More information  Surgery for LOGAN is directed at areas that are responsible for narrowing or complete obstruction of the airway during sleep.  There are a wide range of procedures available to enlarge and/or stabilize the airway to prevent blockage of breathing in the three major areas where it can occur: the palate, tongue, and nasal regions.  Successful surgical treatment depends on the accurate identification of the factors responsible for obstructive sleep apnea in each person.  A personalized approach is required because there is no single treatment that works well for everyone.  Because of anatomic variation, consultation with an examination by a sleep surgeon is a critical first step in determining what surgical options are best for each patient.  In some cases, examination during sedation may be recommended in order to guide the selection of procedures.  Patients will be counseled about risks and benefits as well as the typical recovery course after surgery. Surgery is typically not a cure for a person s LOGAN.  However, surgery will often significantly improve one s LOGAN severity (termed  success rate ).  Even in the absence of a cure, surgery will decrease the cardiovascular risk associated with OSA7; improve overall quality of life8 (sleepiness, functionality, sleep quality, etc).      Palate Procedures:  Patients with LOGAN often have narrowing of their airway in the region of their tonsils and uvula.  The goals  of palate procedures are to widen the airway in this region as well as to help the tissues resist collapse.  Modern palate procedure techniques focus on tissue conservation and soft tissue rearrangement, rather than tissue removal.  Often the uvula is preserved in this procedure. Residual sleep apnea is common in patient after pharyngoplasty with an average reduction in sleep apnea events of 33%2.      Tongue Procedures:  ExamWhile patients are awake, the muscles that surround the throat are active and keep this region open for breathing. These muscles relax during sleep, allowing the tongue and other structures to collapse and block breathing.  There are several different tongue procedures available.  Selection of a tongue base procedure depends on characteristics seen on physical exam.  Generally, procedures are aimed at removing bulky tissues in this area or preventing the back of the tongue from falling back during sleep.  Success rates for tongue surgery range from 50-62%3.    Hypoglossal Nerve Stimulation:  Hypoglossal nerve stimulation has recently received approval from the [place] Food and Drug Administration for the treatment of obstructive sleep apnea.  This is based on research showing that the system was safe and effective in treating sleep apnea6.  Results showed that the median AHI score decreased 68%, from 29.3 to 9.0. This therapy uses an implant system that senses breathing patterns and delivers mild stimulation to airway muscles, which keeps the airway open during sleep.  The system consists of three fully implanted components: a small generator (similar in size to a pacemaker), a breathing sensor, and a stimulation lead.  Using a small handheld remote, a patient turns the therapy on before bed and off upon awakening.    Candidates for this device must be greater than 18 years of age, have moderate to severe LOGAN (AHI between 15-65), BMI less than 35, have tried CPAP/oral appliance for at  least 8 weeks without success, and have appropriate upper airway anatomy (determined by a sleep endoscopy performed by Dr. Antonio Payne).    Hypoglossal Nerve Stimulation Pathway:    The sleep surgeon s office will work with the patient through the insurance prior-authorization process (including communications and appeals).    Nasal Procedures:  Nasal obstruction can interfere with nasal breathing during the day and night.  Studies have shown that relief of nasal obstruction can improve the ability of some patients to tolerate positive airway pressure therapy for obstructive sleep apnea1.  Treatment options include medications such as nasal saline, topical corticosteroid and antihistamine sprays, and oral medications such as antihistamines or decongestants. Non-surgical treatments can include external nasal dilators for selected patients. If these are not successful by themselves, surgery can improve the nasal airway either alone or in combination with these other options.      Combination Procedures:  Combination of surgical procedures and other treatments may be recommended, particularly if patients have more than one area of narrowing or persistent positional disease.  The success rate of combination surgery ranges from 66-80%2,3.    References  Nazanin BRIAN. The Role of the Nose in Snoring and Obstructive Sleep Apnoea: An Update.  Eur Arch Otorhinolaryngol. 2011; 268: 1365-73.   Cappratik SM; Tristin JA; Doris JR; Pallanch JF; Theresa MB; Adali SG; Fredi HUTTON. Surgical modifications of the upper airway for obstructive sleep apnea in adults: a systematic review and meta-analysis. SLEEP 2010;33(10):4733-9516. Jose MEJIA. Hypopharyngeal surgery in obstructive sleep apnea: an evidence-based medicine review.  Arch Otolaryngol Head Neck Surg. 2006 Feb;132(2):206-13.  Osito YH1, Luciano Y, Damian HERNAN. The efficacy of anatomically based multilevel surgery for obstructive sleep apnea. Otolaryngol Head Neck Surg. 2003  Oct;129(4):327-35.  Kezirian E, Goldberg A. Hypopharyngeal Surgery in Obstructive Sleep Apnea: An Evidence-Based Medicine Review. Arch Otolaryngol Head Neck Surg. 2006 Feb;132(2):206-13.  Jaswant VILA et al. Upper-Airway Stimulation for Obstructive Sleep Apnea.  N Engl J Med. 2014 Jan 9;370(2):139-49.  Sixto Y et al. Increased Incidence of Cardiovascular Disease in Middle-aged Men with Obstructive Sleep Apnea. Am J Respir Crit Care Med; 2002 166: 159-165  Lozada EM et al. Studying Life Effects and Effectiveness of Palatopharyngoplasty (SLEEP) study: Subjective Outcomes of Isolated Uvulopalatopharyngoplasty. Otolaryngol Head Neck Surg. 2011; 144: 623-631.        WHAT IF I ONLY HAVE SNORING?    Mandibular advancement devices, lateral sleep positioning, long-term weight loss and treatment of nasal allergies have been shown to improve snoring.  Exercising tongue muscles with a game (https://[employer] [place]/felicia/soundly-reduce-snoring/yr0591227434) or stimulating the tongue during the day with a device (https://doi.org/10.3390/vgk55472488) have improved snoring in some individuals.    Remember to Drive Safe... Drive Alive     Sleep health profoundly affects your health, mood, and your safety.  Thirty three percent of the population (one in three of us) is not getting enough sleep and many have a sleep disorder. Not getting enough sleep or having an untreated / undertreated sleep condition may make us sleepy without even knowing it. In fact, our driving could be dramatically impaired due to our sleep health. As your provider, here are some things I would like you to know about driving:     Here are some warning signs for impairment and dangerous drowsy driving:              -Having been awake more than 16 hours               -Looking tired               -Eyelid drooping              -Head nodding (it could be too late at this point)              -Driving for more than 30 minutes     Some things you could do to make the  driving safer if you are experiencing some drowsiness:              -Stop driving and rest              -Call for transportation              -Make sure your sleep disorder is adequately treated     Some things that have been shown NOT to work when experiencing drowsiness while driving:              -Turning on the radio              -Opening windows              -Eating any  distracting  /  entertaining  foods (e.g., sunflower seeds, candy, or any other)              -Talking on the phone      Your decision may not only impact your life, but also the life of others. Please, remember to drive safe for yourself and all of us.

## 2023-04-18 NOTE — NURSING NOTE
Is the patient currently in the state of [place]? YES    Visit mode:VIDEO    If the visit is dropped, the patient can be reconnected by: VIDEO VISIT: Send to e-mail at: [email or web address]    Will anyone else be joining the visit? NO    How would you like to obtain your AVS? MyChart    Are changes needed to the allergy or medication list? NO    Reason for visit: Video Visit (Snoring)    Flu shot given 1/9/2023    TEGAN Godinez/SOBIA

## 2023-04-21 ENCOUNTER — TELEPHONE (OUTPATIENT)
Dept: SLEEP MEDICINE | Facility: CLINIC | Age: 38
End: 2023-04-21
Payer: COMMERCIAL

## 2023-04-21 NOTE — NURSING NOTE
Writer left a message for patient to call clinic back and reschedule his sleep study. It needs to be psg split night with TCM & a 2 week follow up with Dr. Morgan. Please see telephone encounter.

## 2023-04-21 NOTE — TELEPHONE ENCOUNTER
Writer left a detail message for patient to call back and reschedule his sleep study.     Upon call back please reschedule his sleep study. It needs to be a PSG Split-night with TCM & a follow up with Dr. Morgan 2 weeks after sleep study (or next available time with Dr. Morgan).

## 2023-06-25 ENCOUNTER — OFFICE VISIT (OUTPATIENT)
Dept: URGENT CARE | Facility: URGENT CARE | Age: 38
End: 2023-06-25
Payer: COMMERCIAL

## 2023-06-25 VITALS
HEART RATE: 78 BPM | HEIGHT: 72 IN | SYSTOLIC BLOOD PRESSURE: 137 MMHG | DIASTOLIC BLOOD PRESSURE: 88 MMHG | WEIGHT: 315 LBS | OXYGEN SATURATION: 96 % | BODY MASS INDEX: 42.66 KG/M2 | TEMPERATURE: 98.8 F

## 2023-06-25 DIAGNOSIS — J06.9 VIRAL UPPER RESPIRATORY ILLNESS: Primary | ICD-10-CM

## 2023-06-25 PROCEDURE — 99203 OFFICE O/P NEW LOW 30 MIN: CPT | Performed by: INTERNAL MEDICINE

## 2023-06-25 RX ORDER — CODEINE PHOSPHATE AND GUAIFENESIN 10; 100 MG/5ML; MG/5ML
1-2 SOLUTION ORAL EVERY 4 HOURS PRN
Qty: 180 ML | Refills: 0 | Status: SHIPPED | OUTPATIENT
Start: 2023-06-25 | End: 2024-04-17

## 2023-06-25 RX ORDER — IBUPROFEN 200 MG
200 TABLET ORAL EVERY 4 HOURS PRN
COMMUNITY

## 2023-06-25 RX ORDER — CETIRIZINE HYDROCHLORIDE, PSEUDOEPHEDRINE HYDROCHLORIDE 5; 120 MG/1; MG/1
1 TABLET, FILM COATED, EXTENDED RELEASE ORAL 2 TIMES DAILY
Qty: 20 TABLET | Refills: 0 | Status: SHIPPED | OUTPATIENT
Start: 2023-06-25 | End: 2024-04-17

## 2023-06-25 NOTE — PROGRESS NOTES
Assessment & Plan     Viral upper respiratory illness  Considered range of possible etiologies including rhinovirus, enterovirus, adenovirus, influenza virus, seasonal coronavirus, human metapneumovirus, parainfluenza virus, RSV and COVID-19.  Negative home COVID is sufficient.  Treat symptomatically.  - cetirizine-pseudoePHEDrine ER (ZYRTEC-D) 5-120 MG 12 hr tablet; Take 1 tablet by mouth 2 times daily  - guaiFENesin-codeine (ROBITUSSIN AC) 100-10 MG/5ML solution; Take 5-10 mLs by mouth every 4 hours as needed for cough    Thai Wilkerson MD  [facility] [place]    Nicol Burkett is a 37 year old, presenting for the following health issues:  Urgent Care and Cough (Pt in clinic to have eval for cough and congestion lasting 1+ weeks.)         No data to display              HPI   Complaining of cough over the past week.  Some tickle in the throat. Denies sore throat per se.  Has some scratchiness in the throat.  Soreness in the abdominal muscles with coughing; some headache as well. Nose is congested. Some sinus pressure in the frontal region. Pressure in the ears.  Has been managing with OTC cough drops.  Home COVID test negative.    Review of Systems   Constitutional, HEENT, cardiovascular, pulmonary, gi and gu systems are negative, except as otherwise noted.      Objective    /88   Pulse 78   Temp 98.8  F (37.1  C) (Temporal)   Ht 1.829 m (6')   Wt (!) 161.5 kg (356 lb)   SpO2 96%   BMI 48.28 kg/m    Body mass index is 48.28 kg/m .  Physical Exam   GENERAL APPEARANCE: alert and no distress  HENT: ear canals and TM's normal and cobblestoning of the posterior pharynx with post-nasal drainage   NECK: no adenopathy and no asymmetry, masses, or scars  RESP: lungs clear to auscultation - no rales, rhonchi or wheezes  CV: regular rates and rhythm, normal S1 S2, no S3 or S4 and no murmur, click or rub

## 2023-08-28 ENCOUNTER — NURSE TRIAGE (OUTPATIENT)
Dept: NURSING | Facility: CLINIC | Age: 38
End: 2023-08-28
Payer: COMMERCIAL

## 2023-09-18 ASSESSMENT — SLEEP AND FATIGUE QUESTIONNAIRES
HOW LIKELY ARE YOU TO NOD OFF OR FALL ASLEEP WHILE SITTING AND READING: SLIGHT CHANCE OF DOZING
HOW LIKELY ARE YOU TO NOD OFF OR FALL ASLEEP WHILE WATCHING TV: MODERATE CHANCE OF DOZING
HOW LIKELY ARE YOU TO NOD OFF OR FALL ASLEEP WHEN YOU ARE A PASSENGER IN A CAR FOR AN HOUR WITHOUT A BREAK: WOULD NEVER DOZE
HOW LIKELY ARE YOU TO NOD OFF OR FALL ASLEEP IN A CAR, WHILE STOPPED FOR A FEW MINUTES IN TRAFFIC: WOULD NEVER DOZE
HOW LIKELY ARE YOU TO NOD OFF OR FALL ASLEEP WHILE SITTING QUIETLY AFTER LUNCH WITHOUT ALCOHOL: WOULD NEVER DOZE
HOW LIKELY ARE YOU TO NOD OFF OR FALL ASLEEP WHILE LYING DOWN TO REST IN THE AFTERNOON WHEN CIRCUMSTANCES PERMIT: HIGH CHANCE OF DOZING
HOW LIKELY ARE YOU TO NOD OFF OR FALL ASLEEP WHILE SITTING INACTIVE IN A PUBLIC PLACE: WOULD NEVER DOZE
HOW LIKELY ARE YOU TO NOD OFF OR FALL ASLEEP WHILE SITTING AND TALKING TO SOMEONE: WOULD NEVER DOZE

## 2023-09-20 ENCOUNTER — THERAPY VISIT (OUTPATIENT)
Dept: SLEEP MEDICINE | Facility: CLINIC | Age: 38
End: 2023-09-20
Payer: COMMERCIAL

## 2023-09-20 DIAGNOSIS — E66.01 MORBID OBESITY (H): ICD-10-CM

## 2023-09-20 DIAGNOSIS — G47.33 OSA (OBSTRUCTIVE SLEEP APNEA): ICD-10-CM

## 2023-09-20 PROCEDURE — 95811 POLYSOM 6/>YRS CPAP 4/> PARM: CPT | Performed by: INTERNAL MEDICINE

## 2023-09-21 NOTE — PROGRESS NOTES
Completed a split night PSG per provider order.    Preliminary AHI >30  A final therapeutic PAP pressure was achieved.    Supine REM was seen on therapeutic pressure.    Patient reports feeling refreshed in AM.

## 2023-09-22 PROBLEM — M79.672 PAIN IN BOTH FEET: Status: ACTIVE | Noted: 2023-08-01

## 2023-09-22 PROBLEM — M79.671 PAIN IN BOTH FEET: Status: ACTIVE | Noted: 2023-08-01

## 2023-09-26 LAB — SLPCOMP: NORMAL

## 2023-10-07 ASSESSMENT — SLEEP AND FATIGUE QUESTIONNAIRES
HOW LIKELY ARE YOU TO NOD OFF OR FALL ASLEEP WHILE LYING DOWN TO REST IN THE AFTERNOON WHEN CIRCUMSTANCES PERMIT: HIGH CHANCE OF DOZING
HOW LIKELY ARE YOU TO NOD OFF OR FALL ASLEEP WHILE SITTING QUIETLY AFTER LUNCH WITHOUT ALCOHOL: WOULD NEVER DOZE
HOW LIKELY ARE YOU TO NOD OFF OR FALL ASLEEP WHEN YOU ARE A PASSENGER IN A CAR FOR AN HOUR WITHOUT A BREAK: WOULD NEVER DOZE
HOW LIKELY ARE YOU TO NOD OFF OR FALL ASLEEP WHILE SITTING AND TALKING TO SOMEONE: WOULD NEVER DOZE
HOW LIKELY ARE YOU TO NOD OFF OR FALL ASLEEP WHILE WATCHING TV: HIGH CHANCE OF DOZING
HOW LIKELY ARE YOU TO NOD OFF OR FALL ASLEEP WHILE SITTING INACTIVE IN A PUBLIC PLACE: SLIGHT CHANCE OF DOZING
HOW LIKELY ARE YOU TO NOD OFF OR FALL ASLEEP WHILE SITTING AND READING: SLIGHT CHANCE OF DOZING
HOW LIKELY ARE YOU TO NOD OFF OR FALL ASLEEP IN A CAR, WHILE STOPPED FOR A FEW MINUTES IN TRAFFIC: WOULD NEVER DOZE

## 2023-10-10 ENCOUNTER — OFFICE VISIT (OUTPATIENT)
Dept: SLEEP MEDICINE | Facility: CLINIC | Age: 38
End: 2023-10-10
Payer: COMMERCIAL

## 2023-10-10 VITALS
BODY MASS INDEX: 42.66 KG/M2 | SYSTOLIC BLOOD PRESSURE: 129 MMHG | DIASTOLIC BLOOD PRESSURE: 82 MMHG | HEIGHT: 72 IN | WEIGHT: 315 LBS | HEART RATE: 70 BPM | OXYGEN SATURATION: 94 %

## 2023-10-10 DIAGNOSIS — G47.33 OSA (OBSTRUCTIVE SLEEP APNEA): Primary | ICD-10-CM

## 2023-10-10 PROCEDURE — 99214 OFFICE O/P EST MOD 30 MIN: CPT | Performed by: INTERNAL MEDICINE

## 2023-10-10 NOTE — NURSING NOTE
Scheduled follow up for 1/23/2024 with Iber. Printed Order and printed AVS.    [facility]   Clinic Facilitator   [facility]

## 2023-10-10 NOTE — PROGRESS NOTES
[facility]   Outpatient Sleep Medicine Follow-up Visit  October 10, 2023    Name: Jose Cabello MRN# [id number]   Age: 37 year old YOB: 1985     Date of Consultation: October 10, 2023  Consultation is requested by: No referring provider defined for this encounter.  Primary care provider: Ludin Chicas           Assessment and Plan:     Sleep Diagnoses:  Severe obstructive sleep apnea with hypoxemia with improved continuity on CPAP 18    Comorbid conditions:  Generalized anxiety disorder  BMI 47  Depression  Summary Counseling:  Return to clinic within 30-90 days of starting CPAP or you will lose insurance coverage by medicare rules  Hold mask on face during the day while watching television to acclimate sensation before wearing overnight  Once adapted, use at least 4 hours every night         History of Present Illness:     Jose Cabello is a 37 year old male with a history of insomnia, mood disturbance and severe obstructive sleep apnea and sleep disruption without sleepiness hypoxemia effectively treated during CPAP titration with some increase in central apneas.     PREVIOUS SLEEP STUDIES:  Date: 9/20/2023  Study Date: 9/20/2023  Polysomnogram Data: A full night polysomnogram recorded the standard physiologic parameters including EEG, EOG, EMG, ECG, nasal and oral airflow. Respiratory parameters of chest and abdominal movements were recorded with respiratory inductance plethysmography. Oxygen saturation was recorded by pulse oximetry. Hypopnea scoring rule used: 1B 4%. Technical concerns included paradoxical abdominal movements throughout including during calibration and false/nonphysiologic elevation of CO2 starting at 1 am without subsequent calibration. Diagnostic PSG Sleep Architecture: Severe sleep disruption attributable to respiratory events with absence of stage REM and prolonged wakefulness The total recording time of the polysomnogram was  248.6 minutes. The total sleep time was 157.5 minutes. Sleep latency was normal at 40.6 minutes without the use of a sleep aid. REM latency was - minutes. Arousal index increased at 115.0 arousals per hour. Sleep efficiency was decreased at 63.4%. Wake after sleep onset was 50.5 minutes. The patient spent 44.4% of total sleep time in Stage N1, 55.6% in Stage N2, 0.0% in Stage N3, and 0.0% in REM. Time in REM supine was - minutes.     Respiration: Severe obstructive sleep apnea with hypoxemia  Events ? The polysomnogram revealed a presence of 100 obstructive, 6 central, and 5 mixed apneas resulting in an apnea index of 42.3 events per hour. There were 79 obstructive hypopneas and - central hypopneas resulting in an obstructive hypopnea index of 30.1 and central hypopnea index of - events per hour. The combined apnea/hypopnea index was 72.4 events per hour (central apnea/hypopnea index was 2.3 events per hour). The REM AHI was - events per hour. The supine AHI was - events per hour. The RERA index was 14.1 events per hour. The RDI was 86.5 events per hour.  Snoring - was reported as mild/moderate  Respiratory rate and pattern - was notable for findings above with normal rate  Sustained Sleep Associated Hypoventilation - Transcutaneous carbon dioxide monitoring was used, however significant hypoventilation was not identified during baseline measures.  Sleep Associated Hypoxemia - (Greater than 5 minutes O2 sat at or below 88%) was not present. Baseline oxygen saturation was 92.3%. Lowest oxygen saturation was 82.0%. Time spent less than or equal to 88% was 10.2 minutes. Time spent less than or equal to 89% was 16.7 minutes.       Treatment PSG Sleep Architecture: Improvement in sleep continuity on effective CPAP  At 02:04:29 AM the patient was placed on PAP treatment and was titrated at pressures ranging from CPAP 5 cmH2O up to BiLevel 19/10/0 cmH2O. The total recording time of the treatment portion of the study was  290.6 minutes. The total sleep time was 250.0 minutes. During the treatment portion of the study the sleep latency was 12.5 minutes. REM latency was 105.0 minutes. Arousal index was improved at 29/hour on effective CPAP with 29 arousals per hour.    Respiration: Emergent central apnea supine with CPAP and bilevel PAP with final stability at CPAP 18 with correction of hypoxemia  The final pressure was CPAP 18 cmH2O with an AHI of 1.2 events per hour. Time in REM supine on final pressure was 48.5 minutes.  This titration was considered optimal (residual AHI < 5 events per hour and including REM?supine sleep at final pressure).     Movement Activity: Frequent limb movements with arousal occasionally linked to respiratory events; increased transient muscle activity and leg movements note in REM  Periodic Limb Movements Abundant limb movements with arousals; increased transient muscle activity without dream enactment. o During the diagnostic portion of the study, there were 218 PLMs recorded. The PLM index was 83.0 movements per hour. The PLM Arousal Index was 31.6 per hour. o During the treatment portion of the study, there were 106 PLMs recorded. The PLM index was 25.4 movements per hour. The PLM Arousal Index was 6.2 per hour.  REM EMG Activity - Excessive transient/sustained muscle activity was present.  Nocturnal Behavior - Abnormal sleep related behaviors were not noted.  Bruxism - None apparent. Cardiac Summary: Sinus rhythm During the diagnostic portion of the study, the average pulse rate was 68.7 bpm. The minimum pulse rate was 52.0 bpm while the maximum pulse rate was 105.0 bpm           Most Recent SCALES:    EPWORTH SLEEPINESS SCALE WITHIN 1 YEAR WITHIN 10 DAYS   Sitting and reading 1 1   Watching TV 3 3   Sitting, inactive in a public place (theatre or mtg.) 1  1    As a passenger in a car 0 0   Lying down to rest in the afternoon when circumstance permit 3 3   Sitting and talking to someone 0 0   Sitting  quietly after lunch without alcohol 0 0   In a car, while stopped for a few minutes in traffic 0 0   TOTAL SCORE 8 8   Normal < 11         0--none    1--mild    2--moderate  3--severe      INSOMNIA SEVERITY INDEX WITHIN 1 YEAR   Difficulty falling asleep 1   Difficult staying asleep 1   Problems waking up to early 1   How SATISFIED/DISSATISFIED are you with your CURRENT sleep pattern? 2   How NOTICEABLE to others do you think your sleep pattern is in terms of your quality of life? 4   How WORRIED/DISTRESSED are you about your current sleep pattern? 3   To what extent do you consider your sleep problem to INTERFERE with your daily fuctioning(e.g. daytime fatigue, mood, ability to function at work/daily chores, concentration, mood,etc.) CURRENTLY? 2   INSOMNIA SEVERITY INDEX TOTAL SCORE 14    --absence of insomnia (0-7); sub-threshold insomnia (8-14); moderate insomnia (15-21); and severe insomnia (22-28)--                 Medications:     Current Outpatient Medications   Medication Sig    acetaminophen 500 MG CAPS 1-2 tablets 3 times per day as needed for pain (Patient not taking: Reported on 6/25/2023)    cetirizine-pseudoePHEDrine ER (ZYRTEC-D) 5-120 MG 12 hr tablet Take 1 tablet by mouth 2 times daily    citalopram (CELEXA) 40 MG tablet Take 1 tablet (40 mg) by mouth daily    guaiFENesin-codeine (ROBITUSSIN AC) 100-10 MG/5ML solution Take 5-10 mLs by mouth every 4 hours as needed for cough    ibuprofen (ADVIL/MOTRIN) 200 MG tablet Take 200 mg by mouth every 4 hours as needed for pain     No current facility-administered medications for this visit.        Allergies   Allergen Reactions    Effexor [Venlafaxine]             Problem List:     Patient Active Problem List   Diagnosis    CARDIOVASCULAR SCREENING; LDL GOAL LESS THAN 160    Moderate major depression (H)    Generalized anxiety disorder    Morbid obesity (H)    Pain in both feet            Past Medical History:     Does not need 02 supplement at night   Past  Medical History:   Diagnosis Date    Anxiety     Depression     Headaches              Past Surgical History:    No h/o  upper airway surgery  No past surgical history on file.           Physical Examination:   Objective:    Reported vitals:  There were no vitals taken for this visit.   GENERAL: Healthy, alert and no distress  EYES: Eyes grossly normal to inspection.  No discharge or erythema, or obvious scleral/conjunctival abnormalities.  RESP: No audible wheeze, cough, or visible cyanosis.  No visible retractions or increased work of breathing.    SKIN: Visible skin clear. No significant rash, abnormal pigmentation or lesions.  NEURO: Cranial nerves grossly intact.  Mentation and speech appropriate for age.  PSYCH: Mentation appears normal, affect normal/bright, judgement and insight intact, normal speech and appearance well-groomed.        Copy to: Ludin Chicas MD 10/10/2023         Total time spent reviewing medical records including previous testing and interpretation as well as direct patient contact and documentation on this date: 30 min

## 2023-10-10 NOTE — PATIENT INSTRUCTIONS

## 2023-10-20 ENCOUNTER — DOCUMENTATION ONLY (OUTPATIENT)
Dept: SLEEP MEDICINE | Facility: CLINIC | Age: 38
End: 2023-10-20
Payer: COMMERCIAL

## 2023-10-20 NOTE — PROGRESS NOTES
10/19-23- JL-  STAFF MESSAGE SEN TTO DR BROWN FOR MACHINE CLARIFICATION.  10/20/23- JL- APPT CANCELED DUE TO NO CLARIFICATION. I WILL CALL TO RESCHEDULED ONCE CLARIFICATION RECVD.

## 2023-11-21 ENCOUNTER — DOCUMENTATION ONLY (OUTPATIENT)
Dept: SLEEP MEDICINE | Facility: CLINIC | Age: 38
End: 2023-11-21
Payer: COMMERCIAL

## 2023-11-21 DIAGNOSIS — G47.33 OSA (OBSTRUCTIVE SLEEP APNEA): Primary | ICD-10-CM

## 2023-11-21 NOTE — PROGRESS NOTES
Patient was offered choice of vendor and chose [facility].  Patient Jose Cabello was set up at [place] on November 21, 2023. Patient received a Resmed Airsense 11 Pressures were set at  14-20 cm H2O.   Patient s ramp is 7 cm H2O for Auto and FLEX/EPR is 2.  Patient received a Resmed Mask name: AIRFIT F20  Full Face mask size Medium, heated tubing and heated humidifier.  Patient has the following compliance requirements: usage only  Patient has a follow up on 1/23/2024 with Dr. Morgan.    Mauri Lainez

## 2023-11-28 ENCOUNTER — DOCUMENTATION ONLY (OUTPATIENT)
Dept: SLEEP MEDICINE | Facility: CLINIC | Age: 38
End: 2023-11-28
Payer: COMMERCIAL

## 2023-11-28 NOTE — PROGRESS NOTES
3 day Sleep therapy management telephone visit    Diagnostic AHI: 72.4  PSG    Confirmed with patient at time of call- N/A Patient is still interested in STM service       Message left for patient to return call    Order settings:  CPAP MIN CPAP MAX   14 cm H2O 20 cm H2O       Device settings:  CPAP MIN CPAP MAX EPR RESMED SOFT RESPONSE SETTING   14.0 cm  H20 20.0 cm  H20 TWO OFF       Compliance 28 %    Assessment: Nightly usage most nights under four hours      Patient has the following upcoming sleep appts:  Future Sleep Appointments         Provider Department    1/23/2024 2:30 PM (Arrive by 2:15 PM) Luis Morgan MD [facility] [place]            Replacement device: No  STM ordered by provider: Yes     Total time spent on accessing and  interpreting remote patient PAP therapy data  10 minutes    Total time spent counseling, coaching  and reviewing PAP therapy data with patient  1 minutes    [id number] no

## 2024-01-04 ENCOUNTER — PATIENT OUTREACH (OUTPATIENT)
Dept: CARE COORDINATION | Facility: CLINIC | Age: 39
End: 2024-01-04
Payer: COMMERCIAL

## 2024-01-17 ASSESSMENT — SLEEP AND FATIGUE QUESTIONNAIRES
HOW LIKELY ARE YOU TO NOD OFF OR FALL ASLEEP WHILE WATCHING TV: HIGH CHANCE OF DOZING
HOW LIKELY ARE YOU TO NOD OFF OR FALL ASLEEP WHILE SITTING AND READING: SLIGHT CHANCE OF DOZING
HOW LIKELY ARE YOU TO NOD OFF OR FALL ASLEEP WHEN YOU ARE A PASSENGER IN A CAR FOR AN HOUR WITHOUT A BREAK: SLIGHT CHANCE OF DOZING
HOW LIKELY ARE YOU TO NOD OFF OR FALL ASLEEP IN A CAR, WHILE STOPPED FOR A FEW MINUTES IN TRAFFIC: WOULD NEVER DOZE
HOW LIKELY ARE YOU TO NOD OFF OR FALL ASLEEP WHILE SITTING QUIETLY AFTER LUNCH WITHOUT ALCOHOL: WOULD NEVER DOZE
HOW LIKELY ARE YOU TO NOD OFF OR FALL ASLEEP WHILE SITTING INACTIVE IN A PUBLIC PLACE: WOULD NEVER DOZE
HOW LIKELY ARE YOU TO NOD OFF OR FALL ASLEEP WHILE SITTING AND TALKING TO SOMEONE: WOULD NEVER DOZE
HOW LIKELY ARE YOU TO NOD OFF OR FALL ASLEEP WHILE LYING DOWN TO REST IN THE AFTERNOON WHEN CIRCUMSTANCES PERMIT: HIGH CHANCE OF DOZING

## 2024-01-18 ENCOUNTER — PATIENT OUTREACH (OUTPATIENT)
Dept: CARE COORDINATION | Facility: CLINIC | Age: 39
End: 2024-01-18
Payer: COMMERCIAL

## 2024-01-23 ENCOUNTER — OFFICE VISIT (OUTPATIENT)
Dept: SLEEP MEDICINE | Facility: CLINIC | Age: 39
End: 2024-01-23
Payer: COMMERCIAL

## 2024-01-23 VITALS
DIASTOLIC BLOOD PRESSURE: 71 MMHG | SYSTOLIC BLOOD PRESSURE: 126 MMHG | WEIGHT: 315 LBS | OXYGEN SATURATION: 97 % | HEART RATE: 60 BPM | HEIGHT: 72 IN | RESPIRATION RATE: 16 BRPM | BODY MASS INDEX: 42.66 KG/M2

## 2024-01-23 DIAGNOSIS — G47.33 OSA (OBSTRUCTIVE SLEEP APNEA): Primary | ICD-10-CM

## 2024-01-23 PROCEDURE — 99214 OFFICE O/P EST MOD 30 MIN: CPT | Performed by: INTERNAL MEDICINE

## 2024-01-23 NOTE — PATIENT INSTRUCTIONS

## 2024-01-23 NOTE — PROGRESS NOTES
[facility]   Outpatient Sleep Medicine Follow-up Visit  January 23, 2024    Name: Jose Cabello MRN# [id number]   Age: 38 year old YOB: 1985     Date of Consultation: January 23, 2024  Consultation is requested by: No referring provider defined for this encounter.  Primary care provider: Ludin Chicas           Assessment and Plan:     Sleep Diagnoses:  Severe LOGAN  APAP 14-18  Acceptable adherence  Significant leak    Comorbid conditions:  Generalized anxiety disorder  BMI 47  Depression    Summary Recommendations:  Continue APAP 14-18  New mask fitting    Summary Counseling:  New sleep schedule recommendation:  None         History of Present Illness:     Jose Cabello is a 38 year old male morbid obesity and severe LOGAN here for follow up after PSG and CPAP initiation.    Overall sleep is improved since initiating PAP therapy.  He is now feeling more rested in the morning.  His morning headaches have significantly decreased.  He has gotten used to his CPAP.  He currently is wearing a fullface mask which had been adequate but recently shaved his beard and is now experiencing difficulty with mask fitting which is leading to significant leak and alarm and has led to relatively increased sleep disturbance.    PREVIOUS SLEEP STUDIES:  Date:9.20.2023  AHI: 72 10 minutes </= 88%  Intervention: CPAP  Sleep Architecture: Improved on CPAP     Most Recent SCALES:    EPWORTH SLEEPINESS SCALE WITHIN 1 YEAR WITHIN 10 DAYS   Sitting and reading 1 1   Watching TV 3 3   Sitting, inactive in a public place (theatre or mtg.) 0  0    As a passenger in a car 1 1   Lying down to rest in the afternoon when circumstance permit 3 3   Sitting and talking to someone 0 0   Sitting quietly after lunch without alcohol 0 0   In a car, while stopped for a few minutes in traffic 0 0   TOTAL SCORE 8 8   Normal < 11         0--none    1--mild    2--moderate  3--severe      INSOMNIA  SEVERITY INDEX WITHIN 1 YEAR   Difficulty falling asleep 1   Difficult staying asleep 2   Problems waking up to early 1   How SATISFIED/DISSATISFIED are you with your CURRENT sleep pattern? 2   How NOTICEABLE to others do you think your sleep pattern is in terms of your quality of life? 3   How WORRIED/DISTRESSED are you about your current sleep pattern? 3   To what extent do you consider your sleep problem to INTERFERE with your daily fuctioning(e.g. daytime fatigue, mood, ability to function at work/daily chores, concentration, mood,etc.) CURRENTLY? 2   INSOMNIA SEVERITY INDEX TOTAL SCORE 14    --absence of insomnia (0-7); sub-threshold insomnia (8-14); moderate insomnia (15-21); and severe insomnia (22-28)--        Objective:  CPAP Compliance Targets:   >70% days > 4 hours AHI < 5   30 days ending January 23, 2024  Mask type:  Full Face Mask   ResMed       Auto-PAP 14.0 - 20.0 cmH2O 30 day usage data:    73% of days with > 4 hours of use. 1/30 days with no use.   Average use 335 minutes per day.   95%ile Leak 103.56 L/min.   CPAP 95% pressure 14.7 cm.   AHI 2.67 events per hour.                   Medications:     Current Outpatient Medications   Medication Sig    acetaminophen 500 MG CAPS 1-2 tablets 3 times per day as needed for pain    cetirizine-pseudoePHEDrine ER (ZYRTEC-D) 5-120 MG 12 hr tablet Take 1 tablet by mouth 2 times daily    citalopram (CELEXA) 40 MG tablet Take 1 tablet (40 mg) by mouth daily    guaiFENesin-codeine (ROBITUSSIN AC) 100-10 MG/5ML solution Take 5-10 mLs by mouth every 4 hours as needed for cough    ibuprofen (ADVIL/MOTRIN) 200 MG tablet Take 200 mg by mouth every 4 hours as needed for pain     No current facility-administered medications for this visit.        Allergies   Allergen Reactions    Effexor [Venlafaxine]             Problem List:     Patient Active Problem List   Diagnosis    CARDIOVASCULAR SCREENING; LDL GOAL LESS THAN 160    Moderate major depression (H)    Generalized  anxiety disorder    Morbid obesity (H)    Pain in both feet            Past Medical History:     Does not need 02 supplement at night   Past Medical History:   Diagnosis Date    Anxiety     Depression     Headaches              Past Surgical History:    No h/o  upper airway surgery  No past surgical history on file.           Physical Examination:   Objective:  Vitals: /71   Pulse 60   Resp 16   Ht 1.829 m (6')   Wt (!) 154.8 kg (341 lb 4.8 oz)   SpO2 97%   BMI 46.29 kg/m    BMI= Body mass index is 46.29 kg/m .    GENERAL: Healthy, alert and no distress  EYES: Eyes grossly normal to inspection.  No discharge or erythema, or obvious scleral/conjunctival abnormalities.  RESP: No audible wheeze, cough, or visible cyanosis.  No visible retractions or increased work of breathing.    SKIN: Visible skin clear. No significant rash, abnormal pigmentation or lesions.  NEURO: Cranial nerves grossly intact.  Mentation and speech appropriate for age.  PSYCH: Mentation appears normal, affect normal/bright, judgement and insight intact, normal speech and appearance well-groomed.    Copy to: Ludin Chicas    Patient seen and discussed with Dr. Smith.    Kwadwo Li MD  Pulmonary and Critical Care Fellow  Pager: [phone]      This patient was examined and evaluated jointly with Dr. Li.    The assessment and plan were developed jointly and discussed with the patient during this face-to-face visit. Total time 30minutes with > 50% counseling.    ROSALVA BROWN MD  [facility]  [facility]  Department of Medicine

## 2024-02-01 DIAGNOSIS — F41.1 GENERALIZED ANXIETY DISORDER: ICD-10-CM

## 2024-02-01 DIAGNOSIS — F33.1 MAJOR DEPRESSIVE DISORDER, RECURRENT EPISODE, MODERATE (H): ICD-10-CM

## 2024-02-02 RX ORDER — CITALOPRAM HYDROBROMIDE 40 MG/1
40 TABLET ORAL DAILY
Qty: 90 TABLET | Refills: 0 | Status: SHIPPED | OUTPATIENT
Start: 2024-02-02 | End: 2024-04-17

## 2024-04-07 ENCOUNTER — HEALTH MAINTENANCE LETTER (OUTPATIENT)
Age: 39
End: 2024-04-07

## 2024-04-10 SDOH — HEALTH STABILITY: PHYSICAL HEALTH: ON AVERAGE, HOW MANY MINUTES DO YOU ENGAGE IN EXERCISE AT THIS LEVEL?: 10 MIN

## 2024-04-10 SDOH — HEALTH STABILITY: PHYSICAL HEALTH: ON AVERAGE, HOW MANY DAYS PER WEEK DO YOU ENGAGE IN MODERATE TO STRENUOUS EXERCISE (LIKE A BRISK WALK)?: 1 DAY

## 2024-04-10 ASSESSMENT — SOCIAL DETERMINANTS OF HEALTH (SDOH): HOW OFTEN DO YOU GET TOGETHER WITH FRIENDS OR RELATIVES?: ONCE A WEEK

## 2024-04-16 ASSESSMENT — PATIENT HEALTH QUESTIONNAIRE - PHQ9
SUM OF ALL RESPONSES TO PHQ QUESTIONS 1-9: 4
SUM OF ALL RESPONSES TO PHQ QUESTIONS 1-9: 4
10. IF YOU CHECKED OFF ANY PROBLEMS, HOW DIFFICULT HAVE THESE PROBLEMS MADE IT FOR YOU TO DO YOUR WORK, TAKE CARE OF THINGS AT HOME, OR GET ALONG WITH OTHER PEOPLE: SOMEWHAT DIFFICULT

## 2024-04-17 ENCOUNTER — OFFICE VISIT (OUTPATIENT)
Dept: FAMILY MEDICINE | Facility: CLINIC | Age: 39
End: 2024-04-17
Payer: COMMERCIAL

## 2024-04-17 VITALS
TEMPERATURE: 97.5 F | BODY MASS INDEX: 42.66 KG/M2 | RESPIRATION RATE: 12 BRPM | OXYGEN SATURATION: 97 % | HEIGHT: 72 IN | SYSTOLIC BLOOD PRESSURE: 126 MMHG | HEART RATE: 69 BPM | DIASTOLIC BLOOD PRESSURE: 78 MMHG | WEIGHT: 315 LBS

## 2024-04-17 DIAGNOSIS — E66.01 MORBID OBESITY (H): ICD-10-CM

## 2024-04-17 DIAGNOSIS — Z13.1 SCREENING FOR DIABETES MELLITUS: ICD-10-CM

## 2024-04-17 DIAGNOSIS — Z13.220 SCREENING FOR HYPERLIPIDEMIA: ICD-10-CM

## 2024-04-17 DIAGNOSIS — H61.21 EXCESSIVE EAR WAX, RIGHT: ICD-10-CM

## 2024-04-17 DIAGNOSIS — F33.42 RECURRENT MAJOR DEPRESSIVE DISORDER, IN FULL REMISSION (H): ICD-10-CM

## 2024-04-17 DIAGNOSIS — F41.1 GENERALIZED ANXIETY DISORDER: ICD-10-CM

## 2024-04-17 DIAGNOSIS — L98.9 SKIN LESION: ICD-10-CM

## 2024-04-17 DIAGNOSIS — Z00.00 ROUTINE GENERAL MEDICAL EXAMINATION AT A HEALTH CARE FACILITY: Primary | ICD-10-CM

## 2024-04-17 PROCEDURE — 69209 REMOVE IMPACTED EAR WAX UNI: CPT | Mod: RT | Performed by: FAMILY MEDICINE

## 2024-04-17 PROCEDURE — 80053 COMPREHEN METABOLIC PANEL: CPT | Performed by: FAMILY MEDICINE

## 2024-04-17 PROCEDURE — 80061 LIPID PANEL: CPT | Performed by: FAMILY MEDICINE

## 2024-04-17 PROCEDURE — 96127 BRIEF EMOTIONAL/BEHAV ASSMT: CPT | Performed by: FAMILY MEDICINE

## 2024-04-17 PROCEDURE — 90471 IMMUNIZATION ADMIN: CPT | Performed by: FAMILY MEDICINE

## 2024-04-17 PROCEDURE — 36415 COLL VENOUS BLD VENIPUNCTURE: CPT | Performed by: FAMILY MEDICINE

## 2024-04-17 PROCEDURE — 90715 TDAP VACCINE 7 YRS/> IM: CPT | Performed by: FAMILY MEDICINE

## 2024-04-17 PROCEDURE — 99214 OFFICE O/P EST MOD 30 MIN: CPT | Mod: 25 | Performed by: FAMILY MEDICINE

## 2024-04-17 PROCEDURE — 99395 PREV VISIT EST AGE 18-39: CPT | Mod: 25 | Performed by: FAMILY MEDICINE

## 2024-04-17 RX ORDER — CITALOPRAM HYDROBROMIDE 40 MG/1
40 TABLET ORAL DAILY
Qty: 90 TABLET | Refills: 3 | Status: SHIPPED | OUTPATIENT
Start: 2024-04-17

## 2024-04-17 RX ORDER — TRIAMCINOLONE ACETONIDE 1 MG/G
CREAM TOPICAL 2 TIMES DAILY
Qty: 30 G | Refills: 1 | Status: SHIPPED | OUTPATIENT
Start: 2024-04-17

## 2024-04-17 ASSESSMENT — PAIN SCALES - GENERAL: PAINLEVEL: NO PAIN (0)

## 2024-04-17 NOTE — PROGRESS NOTES
Preventive Care Visit  [facility]  Ludin Juan Chicas MD, MD, Family Medicine  Apr 17, 2024      Assessment & Plan     Routine general medical examination at a health care facility       Generalized anxiety disorder   Patient is tolerating current medication without any major side effects of concerns and current dose seems reasonable too.  Current medication regime is effective. Continue current treatment without any changes.   - citalopram (CELEXA) 40 MG tablet; Take 1 tablet (40 mg) by mouth daily    Morbid obesity (H)  Currently working on his lifestyle and actively losing weight.  Offered weight management clinic.  He will keep it handy and will consider if not improving with his current journey.  - Adult Comprehensive Weight Management  Referral; Future  - Comprehensive metabolic panel; Future    Recurrent major depressive disorder, in full remission (H24)  Patient is tolerating current medication without any major side effects of concerns and current dose seems reasonable too.  Current medication regime is effective. Continue current treatment without any changes.   - citalopram (CELEXA) 40 MG tablet; Take 1 tablet (40 mg) by mouth daily    Screening for hyperlipidemia     - Lipid panel reflex to direct LDL Fasting; Future    Screening for diabetes mellitus     - Comprehensive metabolic panel; Future    Skin lesion  From the CPAP strap.  We discussed Kenalog in the using some sort of support.  If continue to have issue he should reach out to sleep specialist and see what are the other alternative options.  - triamcinolone (KENALOG) 0.1 % external cream; Apply topically 2 times daily    Excessive ear wax, right  Feels plugged on right ear.  Will do ear wash.  - AL REMOVAL IMPACTED CERUMEN IRRIGATION/LVG UNILAT              BMI  Estimated body mass index is 46.18 kg/m  as calculated from the following:    Height as of this encounter: 1.829 m (6').    Weight as of this  encounter: 154.4 kg (340 lb 8 oz).   Weight management plan: Patient referred to endocrine and/or weight management specialty Discussed healthy diet and exercise guidelines    Counseling  Appropriate preventive services were discussed with this patient, including applicable screening as appropriate for fall prevention, nutrition, physical activity, Tobacco-use cessation, weight loss and cognition.  Checklist reviewing preventive services available has been given to the patient.  Reviewed patient's diet, addressing concerns and/or questions.   He is at risk for lack of exercise and has been provided with information to increase physical activity for the benefit of his well-being.   The patient was instructed to see the dentist every 6 months.            Nicol Burkett is a 38 year old, presenting for the following:  Physical        4/17/2024     1:47 PM   Additional Questions   Roomed by Claire casarez        Health Care Directive  Patient does not have a Health Care Directive or Living Will: Discussed advance care planning with patient; information given to patient to review.    HPI    Trying to limit portion, trying to be active.   Planning to do more exercise this summer.    No new changes. Anxiety and depression - under good control.     Feb - covid. Recovered OK.   No alcohol use.     Using cpap machine - strap on back - rash. Burns in the morning when removes cpap.           11/9/2021     1:33 PM 1/9/2023     3:28 PM 4/16/2024     2:49 PM   PHQ   PHQ-9 Total Score 12 5 4   Q9: Thoughts of better off dead/self-harm past 2 weeks Not at all Not at all Not at all         9/1/2020    10:48 AM 11/9/2021     1:50 PM 1/9/2023     3:37 PM   MANUEL-7 SCORE   Total Score 1 14 1           4/10/2024   General Health   How would you rate your overall physical health? (!) POOR   Feel stress (tense, anxious, or unable to sleep) Very much   (!) STRESS CONCERN      4/10/2024   Nutrition   Three or more servings of calcium each day?  (!) NO   Diet: Regular (no restrictions)   How many servings of fruit and vegetables per day? (!) 0-1   How many sweetened beverages each day? 0-1         4/10/2024   Exercise   Days per week of moderate/strenous exercise 1 day   Average minutes spent exercising at this level 10 min   (!) EXERCISE CONCERN      4/10/2024   Social Factors   Frequency of gathering with friends or relatives Once a week   Worry food won't last until get money to buy more No   Food not last or not have enough money for food? No   Do you have housing?  Yes   Are you worried about losing your housing? No   Lack of transportation? No   Unable to get utilities (heat,electricity)? No         4/10/2024   Dental   Dentist two times every year? (!) NO         4/10/2024   TB Screening   Were you born outside of the [place]? No       Today's PHQ-9 Score:       2024     2:49 PM   PHQ-9 SCORE   PHQ-9 Total Score MyChart 4 (Minimal depression)   PHQ-9 Total Score 4         4/10/2024   Substance Use   Alcohol more than 3/day or more than 7/wk Not Applicable   Do you use any other substances recreationally? No     Social History     Tobacco Use    Smoking status: Former     Current packs/day: 0.00     Types: Cigarettes     Quit date: 2010     Years since quittin.3    Smokeless tobacco: Never   Substance Use Topics    Alcohol use: No    Drug use: No           4/10/2024   STI Screening   New sexual partner(s) since last STI/HIV test? No         4/10/2024   Contraception/Family Planning   Questions about contraception or family planning No        Reviewed and updated as needed this visit by Provider       Objective    Exam  /78 (BP Location: Right arm, Patient Position: Sitting, Cuff Size: Adult Large)   Pulse 69   Temp 97.5  F (36.4  C) (Temporal)   Resp 12   Ht 1.829 m (6')   Wt (!) 154.4 kg (340 lb 8 oz)   SpO2 97%   BMI 46.18 kg/m     Estimated body mass index is 46.18 kg/m  as calculated from the following:    Height as of this  encounter: 1.829 m (6').    Weight as of this encounter: 154.4 kg (340 lb 8 oz).    Physical Exam  GENERAL: alert and no distress  EYES: Eyes grossly normal to inspection, PERRL and conjunctivae and sclerae normal  HENT: right ear canal covered with ear wax, left ear canals and TM's normal, nose and mouth without ulcers or lesions  NECK: no adenopathy, no asymmetry, masses, or scars  RESP: lungs clear to auscultation - no rales, rhonchi or wheezes  CV: regular rate and rhythm, normal S1 S2, no S3 or S4, no murmur, click or rub, no peripheral edema  ABDOMEN: soft, nontender, no hepatosplenomegaly, no masses and bowel sounds normal   (male): normal male genitalia without lesions or urethral discharge, no hernia  MS: no gross musculoskeletal defects noted, no edema  SKIN: no suspicious lesions or rashes, back of neck in center hyperpigmented linear rash  NEURO: Normal strength and tone, mentation intact and speech normal  PSYCH: mentation appears normal, affect normal/bright    Signed Electronically by: Ludin Chicas MD, MD    Answers submitted by the patient for this visit:  Patient Health Questionnaire (Submitted on 4/16/2024)  If you checked off any problems, how difficult have these problems made it for you to do your work, take care of things at home, or get along with other people?: Somewhat difficult  PHQ9 TOTAL SCORE: 4

## 2024-04-17 NOTE — NURSING NOTE
Prior to immunization administration, verified patients identity using patient s name and date of birth. Please see Immunization Activity for additional information.     Screening Questionnaire for Adult Immunization    Are you sick today?   No   Do you have allergies to medications, food, a vaccine component or latex?   No   Have you ever had a serious reaction after receiving a vaccination?   No   Do you have a long-term health problem with heart, lung, kidney, or metabolic disease (e.g., diabetes), asthma, a blood disorder, no spleen, complement component deficiency, a cochlear implant, or a spinal fluid leak?  Are you on long-term aspirin therapy?   No   Do you have cancer, leukemia, HIV/AIDS, or any other immune system problem?   No   Do you have a parent, brother, or sister with an immune system problem?   No   In the past 3 months, have you taken medications that affect  your immune system, such as prednisone, other steroids, or anticancer drugs; drugs for the treatment of rheumatoid arthritis, Crohn s disease, or psoriasis; or have you had radiation treatments?   No   Have you had a seizure, or a brain or other nervous system problem?   No   During the past year, have you received a transfusion of blood or blood    products, or been given immune (gamma) globulin or antiviral drug?   No   For women: Are you pregnant or is there a chance you could become       pregnant during the next month?   No   Have you received any vaccinations in the past 4 weeks?   No     Immunization questionnaire answers were all negative.      Patient instructed to remain in clinic for 15 minutes afterwards, and to report any adverse reactions.     Screening performed by Claire Schultz MA on 4/17/2024 at 2:12 PM.

## 2024-04-17 NOTE — PATIENT INSTRUCTIONS
Preventive Care Advice   This is general advice given by our system to help you stay healthy. However, your care team may have specific advice just for you. Please talk to your care team about your preventive care needs.  Nutrition  Eat 5 or more servings of fruits and vegetables each day.  Try wheat bread, brown rice and whole grain pasta (instead of white bread, rice, and pasta).  Get enough calcium and vitamin D. Check the label on foods and aim for 100% of the RDA (recommended daily allowance).  Lifestyle  Exercise at least 150 minutes each week   (30 minutes a day, 5 days a week).  Do muscle strengthening activities 2 days a week. These help control your weight and prevent disease.  No smoking.  Wear sunscreen to prevent skin cancer.  Have a dental exam and cleaning every 6 months.  Yearly exams  See your health care team every year to talk about:  Any changes in your health.  Any medicines your care team has prescribed.  Preventive care, family planning, and ways to prevent chronic diseases.  Shots (vaccines)   HPV shots (up to age 26), if you've never had them before.  Hepatitis B shots (up to age 59), if you've never had them before.  COVID-19 shot: Get this shot when it's due.  Flu shot: Get a flu shot every year.  Tetanus shot: Get a tetanus shot every 10 years.  Pneumococcal, hepatitis A, and RSV shots: Ask your care team if you need these based on your risk.  Shingles shot (for age 50 and up).  General health tests  Diabetes screening:  Starting at age 35, Get screened for diabetes at least every 3 years.  If you are younger than age 35, ask your care team if you should be screened for diabetes.  Cholesterol test: At age 39, start having a cholesterol test every 5 years, or more often if advised.  Bone density scan (DEXA): At age 50, ask your care team if you should have this scan for osteoporosis (brittle bones).  Hepatitis C: Get tested at least once in your life.  STIs (sexually transmitted  infections)  Before age 24: Ask your care team if you should be screened for STIs.  After age 24: Get screened for STIs if you're at risk. You are at risk for STIs (including HIV) if:  You are sexually active with more than one person.  You don't use condoms every time.  You or a partner was diagnosed with a sexually transmitted infection.  If you are at risk for HIV, ask about PrEP medicine to prevent HIV.  Get tested for HIV at least once in your life, whether you are at risk for HIV or not.  Cancer screening tests  Cervical cancer screening: If you have a cervix, begin getting regular cervical cancer screening tests at age 21. Most people who have regular screenings with normal results can stop after age 65. Talk about this with your provider.  Breast cancer scan (mammogram): If you've ever had breasts, begin having regular mammograms starting at age 40. This is a scan to check for breast cancer.  Colon cancer screening: It is important to start screening for colon cancer at age 45.  Have a colonoscopy test every 10 years (or more often if you're at risk) Or, ask your provider about stool tests like a FIT test every year or Cologuard test every 3 years.  To learn more about your testing options, visit: https://www.[employer]/738321.pdf.  For help making a decision, visit: https://bit.ly/im74476.  Prostate cancer screening test: If you have a prostate and are age 55 to 69, ask your provider if you would benefit from a yearly prostate cancer screening test.  Lung cancer screening: If you are a current or former smoker age 50 to 80, ask your care team if ongoing lung cancer screenings are right for you.  For informational purposes only. Not to replace the advice of your health care provider. Copyright   2023 [employer]. All rights reserved. Clinically reviewed by the [facility]. [employer] [id number] - REV 01/24.    Learning About Stress  What is stress?     Stress is your  body's response to a hard situation. Your body can have a physical, emotional, or mental response. Stress is a fact of life for most people, and it affects everyone differently. What causes stress for you may not be stressful for someone else.  A lot of things can cause stress. You may feel stress when you go on a job interview, take a test, or run a race. This kind of short-term stress is normal and even useful. It can help you if you need to work hard or react quickly. For example, stress can help you finish an important job on time.  Long-term stress is caused by ongoing stressful situations or events. Examples of long-term stress include long-term health problems, ongoing problems at work, or conflicts in your family. Long-term stress can harm your health.  How does stress affect your health?  When you are stressed, your body responds as though you are in danger. It makes hormones that speed up your heart, make you breathe faster, and give you a burst of energy. This is called the fight-or-flight stress response. If the stress is over quickly, your body goes back to normal and no harm is done.  But if stress happens too often or lasts too long, it can have bad effects. Long-term stress can make you more likely to get sick, and it can make symptoms of some diseases worse. If you tense up when you are stressed, you may develop neck, shoulder, or low back pain. Stress is linked to high blood pressure and heart disease.  Stress also harms your emotional health. It can make you adan, tense, or depressed. Your relationships may suffer, and you may not do well at work or school.  What can you do to manage stress?  You can try these things to help manage stress:   Do something active. Exercise or activity can help reduce stress. Walking is a great way to get started. Even everyday activities such as housecleaning or yard work can help.  Try yoga or karely chi. These techniques combine exercise and meditation. You may need  some training at first to learn them.  Do something you enjoy. For example, listen to music or go to a movie. Practice your hobby or do volunteer work.  Meditate. This can help you relax, because you are not worrying about what happened before or what may happen in the future.  Do guided imagery. Imagine yourself in any setting that helps you feel calm. You can use online videos, books, or a teacher to guide you.  Do breathing exercises. For example:  From a standing position, bend forward from the waist with your knees slightly bent. Let your arms dangle close to the floor.  Breathe in slowly and deeply as you return to a standing position. Roll up slowly and lift your head last.  Hold your breath for just a few seconds in the standing position.  Breathe out slowly and bend forward from the waist.  Let your feelings out. Talk, laugh, cry, and express anger when you need to. Talking with supportive friends or family, a counselor, or a jonatan leader about your feelings is a healthy way to relieve stress. Avoid discussing your feelings with people who make you feel worse.  Write. It may help to write about things that are bothering you. This helps you find out how much stress you feel and what is causing it. When you know this, you can find better ways to cope.  What can you do to prevent stress?  You might try some of these things to help prevent stress:  Manage your time. This helps you find time to do the things you want and need to do.  Get enough sleep. Your body recovers from the stresses of the day while you are sleeping.  Get support. Your family, friends, and community can make a difference in how you experience stress.  Limit your news feed. Avoid or limit time on social media or news that may make you feel stressed.  Do something active. Exercise or activity can help reduce stress. Walking is a great way to get started.  Where can you learn more?  Go to https://www.healthwise.net/patiented  Enter N032 in the  "search box to learn more about \"Learning About Stress.\"  Current as of: October 24, 2023               Content Version: 14.0    8268-8397 [employer].   Care instructions adapted under license by your healthcare professional. If you have questions about a medical condition or this instruction, always ask your healthcare professional. [employer] disclaims any warranty or liability for your use of this information.      "

## 2024-04-18 LAB
ALBUMIN SERPL BCG-MCNC: 4.8 G/DL (ref 3.5–5.2)
ALP SERPL-CCNC: 60 U/L (ref 40–150)
ALT SERPL W P-5'-P-CCNC: 29 U/L (ref 0–70)
ANION GAP SERPL CALCULATED.3IONS-SCNC: 12 MMOL/L (ref 7–15)
AST SERPL W P-5'-P-CCNC: 22 U/L (ref 0–45)
BILIRUB SERPL-MCNC: 0.7 MG/DL
BUN SERPL-MCNC: 14.8 MG/DL (ref 6–20)
CALCIUM SERPL-MCNC: 9.8 MG/DL (ref 8.6–10)
CHLORIDE SERPL-SCNC: 102 MMOL/L (ref 98–107)
CHOLEST SERPL-MCNC: 208 MG/DL
CREAT SERPL-MCNC: 0.92 MG/DL (ref 0.67–1.17)
DEPRECATED HCO3 PLAS-SCNC: 27 MMOL/L (ref 22–29)
EGFRCR SERPLBLD CKD-EPI 2021: >90 ML/MIN/1.73M2
FASTING STATUS PATIENT QL REPORTED: YES
GLUCOSE SERPL-MCNC: 90 MG/DL (ref 70–99)
HDLC SERPL-MCNC: 37 MG/DL
LDLC SERPL CALC-MCNC: 137 MG/DL
NONHDLC SERPL-MCNC: 171 MG/DL
POTASSIUM SERPL-SCNC: 4.4 MMOL/L (ref 3.4–5.3)
PROT SERPL-MCNC: 7.4 G/DL (ref 6.4–8.3)
SODIUM SERPL-SCNC: 141 MMOL/L (ref 135–145)
TRIGL SERPL-MCNC: 171 MG/DL

## 2025-04-12 SDOH — HEALTH STABILITY: PHYSICAL HEALTH: ON AVERAGE, HOW MANY MINUTES DO YOU ENGAGE IN EXERCISE AT THIS LEVEL?: 10 MIN

## 2025-04-12 SDOH — HEALTH STABILITY: PHYSICAL HEALTH: ON AVERAGE, HOW MANY DAYS PER WEEK DO YOU ENGAGE IN MODERATE TO STRENUOUS EXERCISE (LIKE A BRISK WALK)?: 2 DAYS

## 2025-04-12 ASSESSMENT — SOCIAL DETERMINANTS OF HEALTH (SDOH): HOW OFTEN DO YOU GET TOGETHER WITH FRIENDS OR RELATIVES?: ONCE A WEEK

## 2025-04-16 ASSESSMENT — PATIENT HEALTH QUESTIONNAIRE - PHQ9
10. IF YOU CHECKED OFF ANY PROBLEMS, HOW DIFFICULT HAVE THESE PROBLEMS MADE IT FOR YOU TO DO YOUR WORK, TAKE CARE OF THINGS AT HOME, OR GET ALONG WITH OTHER PEOPLE: VERY DIFFICULT
SUM OF ALL RESPONSES TO PHQ QUESTIONS 1-9: 7
SUM OF ALL RESPONSES TO PHQ QUESTIONS 1-9: 7

## 2025-04-17 ENCOUNTER — OFFICE VISIT (OUTPATIENT)
Dept: FAMILY MEDICINE | Facility: CLINIC | Age: 40
End: 2025-04-17
Attending: FAMILY MEDICINE
Payer: COMMERCIAL

## 2025-04-17 VITALS
OXYGEN SATURATION: 98 % | WEIGHT: 315 LBS | HEART RATE: 61 BPM | RESPIRATION RATE: 16 BRPM | TEMPERATURE: 97 F | DIASTOLIC BLOOD PRESSURE: 88 MMHG | SYSTOLIC BLOOD PRESSURE: 136 MMHG | BODY MASS INDEX: 42.66 KG/M2 | HEIGHT: 72 IN

## 2025-04-17 DIAGNOSIS — Z13.220 SCREENING FOR HYPERLIPIDEMIA: ICD-10-CM

## 2025-04-17 DIAGNOSIS — F33.1 MODERATE EPISODE OF RECURRENT MAJOR DEPRESSIVE DISORDER (H): ICD-10-CM

## 2025-04-17 DIAGNOSIS — Z13.1 SCREENING FOR DIABETES MELLITUS: ICD-10-CM

## 2025-04-17 DIAGNOSIS — Z00.00 ROUTINE GENERAL MEDICAL EXAMINATION AT A HEALTH CARE FACILITY: Primary | ICD-10-CM

## 2025-04-17 DIAGNOSIS — F41.1 GENERALIZED ANXIETY DISORDER: ICD-10-CM

## 2025-04-17 DIAGNOSIS — E66.01 MORBID OBESITY (H): ICD-10-CM

## 2025-04-17 LAB
EST. AVERAGE GLUCOSE BLD GHB EST-MCNC: 103 MG/DL
HBA1C MFR BLD: 5.2 % (ref 0–5.6)

## 2025-04-17 PROCEDURE — 99213 OFFICE O/P EST LOW 20 MIN: CPT | Mod: 25 | Performed by: FAMILY MEDICINE

## 2025-04-17 PROCEDURE — 99396 PREV VISIT EST AGE 40-64: CPT | Performed by: FAMILY MEDICINE

## 2025-04-17 PROCEDURE — 3075F SYST BP GE 130 - 139MM HG: CPT | Performed by: FAMILY MEDICINE

## 2025-04-17 PROCEDURE — 1125F AMNT PAIN NOTED PAIN PRSNT: CPT | Performed by: FAMILY MEDICINE

## 2025-04-17 PROCEDURE — 90480 ADMN SARSCOV2 VAC 1/ONLY CMP: CPT | Performed by: FAMILY MEDICINE

## 2025-04-17 PROCEDURE — 91320 SARSCV2 VAC 30MCG TRS-SUC IM: CPT | Performed by: FAMILY MEDICINE

## 2025-04-17 PROCEDURE — 80061 LIPID PANEL: CPT | Performed by: FAMILY MEDICINE

## 2025-04-17 PROCEDURE — 36415 COLL VENOUS BLD VENIPUNCTURE: CPT | Performed by: FAMILY MEDICINE

## 2025-04-17 PROCEDURE — 3079F DIAST BP 80-89 MM HG: CPT | Performed by: FAMILY MEDICINE

## 2025-04-17 PROCEDURE — G2211 COMPLEX E/M VISIT ADD ON: HCPCS | Performed by: FAMILY MEDICINE

## 2025-04-17 PROCEDURE — 83036 HEMOGLOBIN GLYCOSYLATED A1C: CPT | Performed by: FAMILY MEDICINE

## 2025-04-17 RX ORDER — CITALOPRAM HYDROBROMIDE 40 MG/1
40 TABLET ORAL DAILY
Qty: 90 TABLET | Refills: 3 | Status: SHIPPED | OUTPATIENT
Start: 2025-04-17

## 2025-04-17 ASSESSMENT — PAIN SCALES - GENERAL: PAINLEVEL_OUTOF10: MODERATE PAIN (4)

## 2025-04-17 NOTE — PROGRESS NOTES
"Preventive Care Visit  [facility] Juan Chicas MD, MD, Family Medicine  Apr 17, 2025      Assessment & Plan     Routine general medical examination at a health care facility       Morbid obesity (H)  Body mass index is 47.17 kg/m .  We discussed about weight management clinic, GLP-1 agent.  He understands.  He wishes to continue to work on his lifestyle.  He will consider GLP-1 agent in future.  Mother is on Ozempic for diabetes and she has noticed significant weight loss.    Generalized anxiety disorder  Patient is tolerating current medication without any major side effects of concerns and current dose seems reasonable too.  Current medication regime is effective. Continue current treatment without any changes.   - citalopram (CELEXA) 40 MG tablet; Take 1 tablet (40 mg) by mouth daily.    Moderate episode of recurrent major depressive disorder (H)  Patient is tolerating current medication without any major side effects of concerns and current dose seems reasonable too.  Current medication regime is effective. Continue current treatment without any changes.   - citalopram (CELEXA) 40 MG tablet; Take 1 tablet (40 mg) by mouth daily.    Screening for hyperlipidemia     - Lipid panel reflex to direct LDL Fasting; Future  - Lipid panel reflex to direct LDL Fasting    Screening for diabetes mellitus     - HEMOGLOBIN A1C; Future  - HEMOGLOBIN A1C            BMI  Estimated body mass index is 47.17 kg/m  as calculated from the following:    Height as of this encounter: 1.822 m (5' 11.75\").    Weight as of this encounter: 156.7 kg (345 lb 6.4 oz).   Weight management plan: Discussed healthy diet and exercise guidelines    Counseling  Appropriate preventive services were addressed with this patient via screening, questionnaire, or discussion as appropriate for fall prevention, nutrition, physical activity, Tobacco-use cessation, social engagement, weight loss and cognition.  Checklist " reviewing preventive services available has been given to the patient.  The patient's PHQ-9 score is consistent with mild depression. He was provided with information regarding depression.       The longitudinal plan of care for the diagnosis(es)/condition(s) as documented were addressed during this visit. Due to the added complexity in care, I will continue to support Kwadwo in the subsequent management and with ongoing continuity of care.    Subjective   Kwadwo is a 39 year old, presenting for the following:  Physical        4/17/2025     1:35 PM   Additional Questions   Roomed by Claire SARAVIA     Feeling tired all the time.   Soreness back, right foot pain.   3 family members did weight loss surgery but they gained ewight back.  Mother with diabetes on glp1 agent. She is losing significant weight.   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   Depression and anxiety - irritability, some hopelessness, frustration, some loss of interest. Celexa is helpful.    Advance Care Planning    Discussed advance care planning with patient; however, patient declined at this time.        4/12/2025   General Health   How would you rate your overall physical health? (!) FAIR   Feel stress (tense, anxious, or unable to sleep) To some extent   (!) STRESS CONCERN      4/12/2025   Nutrition   Three or more servings of calcium each day? (!) NO   Diet: Regular (no restrictions)   How many servings of fruit and vegetables per day? (!) 0-1   How many sweetened beverages each day? (!) 2         4/12/2025   Exercise   Days per week of moderate/strenous exercise 2 days   Average minutes spent exercising at this level 10 min   (!) EXERCISE CONCERN      4/12/2025   Social Factors   Frequency of gathering with friends or relatives Once a week   Worry food won't last until get money to buy more No   Food not last or not have enough money for food? Yes   Do you have housing? (Housing is defined as stable permanent housing and  "does not include staying ouside in a car, in a tent, in an abandoned building, in an overnight shelter, or couch-surfing.) Yes   Are you worried about losing your housing? Yes   Lack of transportation? No   Unable to get utilities (heat,electricity)? No   Want help with housing or utility concern? No   (!) FOOD SECURITY CONCERN PRESENT(!) HOUSING CONCERN PRESENT      2025   Dental   Dentist two times every year? Yes       Today's PHQ-9 Score:       2025     7:19 PM   PHQ-9 SCORE   PHQ-9 Total Score MyChart 7 (Mild depression)   PHQ-9 Total Score 7        Patient-reported         2025   Substance Use   Alcohol more than 3/day or more than 7/wk Not Applicable   Do you use any other substances recreationally? No     Social History     Tobacco Use    Smoking status: Former     Current packs/day: 0.00     Types: Cigarettes     Quit date: 2010     Years since quittin.3    Smokeless tobacco: Never   Substance Use Topics    Alcohol use: No    Drug use: No           2025   STI Screening   New sexual partner(s) since last STI/HIV test? No   ASCVD Risk   The 10-year ASCVD risk score (Charlotte ESPINO, et al., 2019) is: 2.1%    Values used to calculate the score:      Age: 39 years      Sex: Male      Is Non- : No      Diabetic: No      Tobacco smoker: No      Systolic Blood Pressure: 136 mmHg      Is BP treated: No      HDL Cholesterol: 37 mg/dL      Total Cholesterol: 208 mg/dL        2025   Contraception/Family Planning   Questions about contraception or family planning No        Reviewed and updated as needed this visit by Provider                             Objective    Exam  /88 (BP Location: Right arm, Patient Position: Sitting, Cuff Size: Adult Large)   Pulse 61   Temp 97  F (36.1  C) (Temporal)   Resp 16   Ht 1.822 m (5' 11.75\")   Wt (!) 156.7 kg (345 lb 6.4 oz)   SpO2 98%   BMI 47.17 kg/m     Estimated body mass index is 47.17 kg/m  as " "calculated from the following:    Height as of this encounter: 1.822 m (5' 11.75\").    Weight as of this encounter: 156.7 kg (345 lb 6.4 oz).    Physical Exam  GENERAL: alert and no distress  EYES: Eyes grossly normal to inspection, PERRL and conjunctivae and sclerae normal  HENT: ear canals and TM's normal, nose and mouth without ulcers or lesions  NECK: no adenopathy, no asymmetry, masses, or scars  RESP: lungs clear to auscultation - no rales, rhonchi or wheezes  CV: regular rate and rhythm, normal S1 S2, no S3 or S4, no murmur, click or rub, no peripheral edema  ABDOMEN: soft, nontender, no hepatosplenomegaly, no masses and bowel sounds normal   (male): normal male genitalia without lesions or urethral discharge, no hernia  MS: no gross musculoskeletal defects noted, no edema  SKIN: no suspicious lesions or rashes  NEURO: Normal strength and tone, mentation intact and speech normal  PSYCH: mentation appears normal, affect normal/bright        Signed Electronically by: Ludin Chicas MD, MD    Answers submitted by the patient for this visit:  Patient Health Questionnaire (Submitted on 4/16/2025)  If you checked off any problems, how difficult have these problems made it for you to do your work, take care of things at home, or get along with other people?: Very difficult  PHQ9 TOTAL SCORE: 7    "

## 2025-04-17 NOTE — PATIENT INSTRUCTIONS
Patient Education   Preventive Care Advice   This is general advice given by our system to help you stay healthy. However, your care team may have specific advice just for you. Please talk to your care team about your preventive care needs.  Nutrition  Eat 5 or more servings of fruits and vegetables each day.  Try wheat bread, brown rice and whole grain pasta (instead of white bread, rice, and pasta).  Get enough calcium and vitamin D. Check the label on foods and aim for 100% of the RDA (recommended daily allowance).  Lifestyle  Exercise at least 150 minutes each week  (30 minutes a day, 5 days a week).  Do muscle strengthening activities 2 days a week. These help control your weight and prevent disease.  No smoking.  Wear sunscreen to prevent skin cancer.  Have a dental exam and cleaning every 6 months.  Yearly exams  See your health care team every year to talk about:  Any changes in your health.  Any medicines your care team has prescribed.  Preventive care, family planning, and ways to prevent chronic diseases.  Shots (vaccines)   HPV shots (up to age 26), if you've never had them before.  Hepatitis B shots (up to age 59), if you've never had them before.  COVID-19 shot: Get this shot when it's due.  Flu shot: Get a flu shot every year.  Tetanus shot: Get a tetanus shot every 10 years.  Pneumococcal, hepatitis A, and RSV shots: Ask your care team if you need these based on your risk.  Shingles shot (for age 50 and up)  General health tests  Diabetes screening:  Starting at age 35, Get screened for diabetes at least every 3 years.  If you are younger than age 35, ask your care team if you should be screened for diabetes.  Cholesterol test: At age 39, start having a cholesterol test every 5 years, or more often if advised.  Bone density scan (DEXA): At age 50, ask your care team if you should have this scan for osteoporosis (brittle bones).  Hepatitis C: Get tested at least once in your life.  STIs (sexually  transmitted infections)  Before age 24: Ask your care team if you should be screened for STIs.  After age 24: Get screened for STIs if you're at risk. You are at risk for STIs (including HIV) if:  You are sexually active with more than one person.  You don't use condoms every time.  You or a partner was diagnosed with a sexually transmitted infection.  If you are at risk for HIV, ask about PrEP medicine to prevent HIV.  Get tested for HIV at least once in your life, whether you are at risk for HIV or not.  Cancer screening tests  Cervical cancer screening: If you have a cervix, begin getting regular cervical cancer screening tests starting at age 21.  Breast cancer scan (mammogram): If you've ever had breasts, begin having regular mammograms starting at age 40. This is a scan to check for breast cancer.  Colon cancer screening: It is important to start screening for colon cancer at age 45.  Have a colonoscopy test every 10 years (or more often if you're at risk) Or, ask your provider about stool tests like a FIT test every year or Cologuard test every 3 years.  To learn more about your testing options, visit:   .  For help making a decision, visit:   https://bit.ly/zl15109.  Prostate cancer screening test: If you have a prostate, ask your care team if a prostate cancer screening test (PSA) at age 55 is right for you.  Lung cancer screening: If you are a current or former smoker ages 50 to 80, ask your care team if ongoing lung cancer screenings are right for you.  For informational purposes only. Not to replace the advice of your health care provider. Copyright   2023 [facility]. All rights reserved. Clinically reviewed by the [facility]. Blink [id number] - REV 01/24.  Learning About Stress  What is stress?     Stress is your body's response to a hard situation. Your body can have a physical, emotional, or mental response. Stress is a fact of life for most people, and it  affects everyone differently. What causes stress for you may not be stressful for someone else.  A lot of things can cause stress. You may feel stress when you go on a job interview, take a test, or run a race. This kind of short-term stress is normal and even useful. It can help you if you need to work hard or react quickly. For example, stress can help you finish an important job on time.  Long-term stress is caused by ongoing stressful situations or events. Examples of long-term stress include long-term health problems, ongoing problems at work, or conflicts in your family. Long-term stress can harm your health.  How does stress affect your health?  When you are stressed, your body responds as though you are in danger. It makes hormones that speed up your heart, make you breathe faster, and give you a burst of energy. This is called the fight-or-flight stress response. If the stress is over quickly, your body goes back to normal and no harm is done.  But if stress happens too often or lasts too long, it can have bad effects. Long-term stress can make you more likely to get sick, and it can make symptoms of some diseases worse. If you tense up when you are stressed, you may develop neck, shoulder, or low back pain. Stress is linked to high blood pressure and heart disease.  Stress also harms your emotional health. It can make you adan, tense, or depressed. Your relationships may suffer, and you may not do well at work or school.  What can you do to manage stress?  You can try these things to help manage stress:   Do something active. Exercise or activity can help reduce stress. Walking is a great way to get started. Even everyday activities such as housecleaning or yard work can help.  Try yoga or karely chi. These techniques combine exercise and meditation. You may need some training at first to learn them.  Do something you enjoy. For example, listen to music or go to a movie. Practice your hobby or do volunteer  "work.  Meditate. This can help you relax, because you are not worrying about what happened before or what may happen in the future.  Do guided imagery. Imagine yourself in any setting that helps you feel calm. You can use online videos, books, or a teacher to guide you.  Do breathing exercises. For example:  From a standing position, bend forward from the waist with your knees slightly bent. Let your arms dangle close to the floor.  Breathe in slowly and deeply as you return to a standing position. Roll up slowly and lift your head last.  Hold your breath for just a few seconds in the standing position.  Breathe out slowly and bend forward from the waist.  Let your feelings out. Talk, laugh, cry, and express anger when you need to. Talking with supportive friends or family, a counselor, or a jonatan leader about your feelings is a healthy way to relieve stress. Avoid discussing your feelings with people who make you feel worse.  Write. It may help to write about things that are bothering you. This helps you find out how much stress you feel and what is causing it. When you know this, you can find better ways to cope.  What can you do to prevent stress?  You might try some of these things to help prevent stress:  Manage your time. This helps you find time to do the things you want and need to do.  Get enough sleep. Your body recovers from the stresses of the day while you are sleeping.  Get support. Your family, friends, and community can make a difference in how you experience stress.  Limit your news feed. Avoid or limit time on social media or news that may make you feel stressed.  Do something active. Exercise or activity can help reduce stress. Walking is a great way to get started.  Where can you learn more?  Go to https://www.[employer].net/patiented  Enter N032 in the search box to learn more about \"Learning About Stress.\"  Current as of: October 24, 2024  Content Version: 14.4 2024-2025 [employer]   Care instructions adapted under license by your healthcare professional. If you have questions about a medical condition or this instruction, always ask your healthcare professional. [employer] disclaims any warranty or liability for your use of this information.    Learning About Depression Screening  What is depression screening?  Depression screening is a way to see if you have depression symptoms. It may be done by a doctor or counselor. It's often part of a routine checkup. That's because your mental health is just as important as your physical health.  Depression is a mental health condition that affects how you feel, think, and act. You may:  Have less energy.  Lose interest in your daily activities.  Feel sad and grouchy for a long time.  Depression is very common. It affects people of all ages.  Many things can lead to depression. Some people become depressed after they have a stroke or find out they have a major illness like cancer or heart disease. The death of a loved one or a breakup may lead to depression. It can run in families. Most experts believe that a combination of inherited genes and stressful life events can cause it.  What happens during screening?  You may be asked to fill out a form about your depression symptoms. You and the doctor will discuss your answers. The doctor may ask you more questions to learn more about how you think, act, and feel.  What happens after screening?  If you have symptoms of depression, your doctor will talk to you about your options.  Doctors usually treat depression with medicines or counseling. Often, combining the two works best. Many people don't get help because they think that they'll get over the depression on their own. But people with depression may not get better unless they get treatment.  The cause of depression is not well understood. There may be many factors involved. But if you have depression, it's not your fault.  A serious  "symptom of depression is thinking about death or suicide. If you or someone you care about talks about this or about feeling hopeless, get help right away.  It's important to know that depression can be treated. Medicine, counseling, and self-care may help.  Where can you learn more?  Go to https://www.[employer].net/patiented  Enter T185 in the search box to learn more about \"Learning About Depression Screening.\"  Current as of: July 31, 2024  Content Version: 14.4    5930-2458 [employer].   Care instructions adapted under license by your healthcare professional. If you have questions about a medical condition or this instruction, always ask your healthcare professional. [employer] disclaims any warranty or liability for your use of this information.       "

## 2025-04-18 LAB
CHOLEST SERPL-MCNC: 206 MG/DL
FASTING STATUS PATIENT QL REPORTED: YES
HDLC SERPL-MCNC: 36 MG/DL
LDLC SERPL CALC-MCNC: 135 MG/DL
NONHDLC SERPL-MCNC: 170 MG/DL
TRIGL SERPL-MCNC: 175 MG/DL

## 2025-04-18 NOTE — RESULT ENCOUNTER NOTE
Your good cholesterol (HDL) is low, which improves with exercise.    Bad cholesterol LDL is borderline high.  Weight loss helps to avoid worsening of bad cholesterol.  Diabetes test is within normal limit.    Ludin Chicas MD  [facility]

## 2025-05-02 ENCOUNTER — DOCUMENTATION ONLY (OUTPATIENT)
Dept: SLEEP MEDICINE | Facility: CLINIC | Age: 40
End: 2025-05-02
Payer: COMMERCIAL

## 2025-05-02 DIAGNOSIS — G47.33 OSA (OBSTRUCTIVE SLEEP APNEA): Primary | ICD-10-CM

## 2025-05-05 NOTE — CONFIDENTIAL NOTE
Supply Order- Current CPAP status    Subjective  This patient has used has used CPAP for 4 hours Compliance  50 % [minimum acceptable 70%]  of the last 30 days and is benefiting in terms of improvement of sleep, wakefulness or health risk reduction?  Patient has been on therapy 18 months with periods of suboptimal use including currently.    Data download on this date  Objective (30 days to this date)  AHI 0.86   last  upload [normal <10]  Compliance  50 % [minimum acceptable 70%]   Average use 212 minutes [minimum > 4 hours]  PAP settings:   CPAP min 14.0  cm  H20  CPAP max 20.0 cm  H20      PAP deployed:  Resmed 95th% pressure 14.4 cm